# Patient Record
Sex: FEMALE | Race: WHITE | NOT HISPANIC OR LATINO | Employment: OTHER | URBAN - METROPOLITAN AREA
[De-identification: names, ages, dates, MRNs, and addresses within clinical notes are randomized per-mention and may not be internally consistent; named-entity substitution may affect disease eponyms.]

---

## 2022-03-18 ENCOUNTER — APPOINTMENT (OUTPATIENT)
Dept: RADIOLOGY | Facility: MEDICAL CENTER | Age: 77
End: 2022-03-18
Attending: INTERNAL MEDICINE
Payer: MEDICARE

## 2022-03-23 ENCOUNTER — APPOINTMENT (OUTPATIENT)
Dept: RADIOLOGY | Facility: MEDICAL CENTER | Age: 77
End: 2022-03-23
Attending: INTERNAL MEDICINE
Payer: MEDICARE

## 2022-03-24 ENCOUNTER — HOSPITAL ENCOUNTER (OUTPATIENT)
Dept: RADIOLOGY | Facility: MEDICAL CENTER | Age: 77
End: 2022-03-24
Attending: INTERNAL MEDICINE
Payer: MEDICARE

## 2022-03-24 DIAGNOSIS — R53.82 CHRONIC FATIGUE: ICD-10-CM

## 2022-03-24 DIAGNOSIS — R41.3 MEMORY LOSS: ICD-10-CM

## 2022-03-24 PROCEDURE — 70551 MRI BRAIN STEM W/O DYE: CPT | Mod: MG

## 2022-04-20 ENCOUNTER — TELEPHONE (OUTPATIENT)
Dept: NEUROLOGY | Facility: MEDICAL CENTER | Age: 77
End: 2022-04-20
Payer: MEDICARE

## 2022-04-21 ENCOUNTER — OFFICE VISIT (OUTPATIENT)
Dept: NEUROLOGY | Facility: MEDICAL CENTER | Age: 77
End: 2022-04-21
Attending: PSYCHIATRY & NEUROLOGY
Payer: MEDICARE

## 2022-04-21 VITALS
HEIGHT: 67 IN | WEIGHT: 167.99 LBS | DIASTOLIC BLOOD PRESSURE: 62 MMHG | TEMPERATURE: 97.6 F | SYSTOLIC BLOOD PRESSURE: 156 MMHG | BODY MASS INDEX: 26.37 KG/M2 | RESPIRATION RATE: 16 BRPM | OXYGEN SATURATION: 82 % | HEART RATE: 94 BPM

## 2022-04-21 DIAGNOSIS — Z78.9: Primary | ICD-10-CM

## 2022-04-21 PROCEDURE — 99205 OFFICE O/P NEW HI 60 MIN: CPT | Performed by: PSYCHIATRY & NEUROLOGY

## 2022-04-21 PROCEDURE — 99212 OFFICE O/P EST SF 10 MIN: CPT | Performed by: PSYCHIATRY & NEUROLOGY

## 2022-04-21 ASSESSMENT — ANXIETY QUESTIONNAIRES
4. TROUBLE RELAXING: SEVERAL DAYS
5. BEING SO RESTLESS THAT IT IS HARD TO SIT STILL: NOT AT ALL
3. WORRYING TOO MUCH ABOUT DIFFERENT THINGS: SEVERAL DAYS
2. NOT BEING ABLE TO STOP OR CONTROL WORRYING: NOT AT ALL
1. FEELING NERVOUS, ANXIOUS, OR ON EDGE: SEVERAL DAYS
IF YOU CHECKED OFF ANY PROBLEMS ON THIS QUESTIONNAIRE, HOW DIFFICULT HAVE THESE PROBLEMS MADE IT FOR YOU TO DO YOUR WORK, TAKE CARE OF THINGS AT HOME, OR GET ALONG WITH OTHER PEOPLE: NOT DIFFICULT AT ALL
6. BECOMING EASILY ANNOYED OR IRRITABLE: NOT AT ALL
GAD7 TOTAL SCORE: 3
7. FEELING AFRAID AS IF SOMETHING AWFUL MIGHT HAPPEN: NOT AT ALL

## 2022-04-21 ASSESSMENT — PATIENT HEALTH QUESTIONNAIRE - PHQ9: CLINICAL INTERPRETATION OF PHQ2 SCORE: 0

## 2022-04-21 NOTE — PROGRESS NOTES
"Reason for Neurology Consult:  ? Of Dementia or cognitive impairment    History of present illness:    LANG Harrell 76 y.o. right handed woman who was born in [place] and [place] and then [place] and moved in  2018 to [place].  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  She retired formally in 2009. She lives with her  a 2 year old dog.    She describes nor endorses any  specific  issues with cognition and memory and we discussed specific issues about ADL's and she has not had any issues with her abilities. She denies being forgetful, misplacing personal items, needing to rely on a calendar or notes nor losing track of time/dates nor having any issues driving at all.    She continues to exercise  (2.4 miles- 3 times a week) and she will get a vascular performance machine 2 days per week.    She rarely has a headache but infrequently may have a \"band like sensation or sponge going in and out over the bilateral head region\" which started to occur over the last 2-3 years and usually  over her forehead and that can last for 2-3 minutes (not hours to days) and without any associated confusion,disorientation,speech disturbance(s), focal motor-sensory features,visual changes or sensitivity to light or sound  and she will take deep breath and activate a conversation. This tends to occur 4 times per nona without any discrete triggers she is aware of.    She has not had any orthostatic dizziness/lightheadedness or near syncope in the last 6-12 months.    She has not had any TIA/Stroke(s) known or documented in the EMR.    She has noticed since 1790-0890 to have a low level aching of the anterior legs and rarely the feet (but today her feet feel completely normal) . She has a deep seated ache in her lower legs (not superficially located) and not discretely associated with numbness or tingling of the feet,toes or hands. She had been on " "Gabapentin since 2019 and even in the 7 years previous to starting the Gabapentin (BID dosing) she had not noticed any symptoms evolving of her legs or changing in it's characteristics. She has not had any muscle cramps,muscle spasms or evolving stiffness or tightness feelings of the lower legs.    She denies any discrete,daily or ongoing numbness,tingling,burning,aching of the toes,pads of feet or dorsal feet in the last 6 months.    She has not had any seizure type events when discussing any paroxysmal events she may have had  and she denies having any paroxysmal events (other when \"purposefully \"fainting\"  when in [place] [employer] when age 13-16).    She has no evidence for involuntary movements of the limbs or body in the last 6-12 months.    She denies any lower back pains,aches or radicular type complaints going down either legs or into discrete areas of her leg(s) in the last 6-12 months.    She has not had any problems with increasing frequency,urgency or zac incontinence in the last 6 months.    She denies any problems or difficulty with getting sleep and infrequently has vivid dreams but nothing to me that sounds like REM Sleep Behavioral symptoms. Averages 6 hours of sleep for many years and generally feels rested.    She has not noticed any evolving gait-balance decline in the last 3-6 months.        There are no problems to display for this patient.      Past medical history:   No past medical history on file.    Past surgical history:   No past surgical history on file.      Social history:   Social History     Socioeconomic History   • Marital status:      Spouse name: Not on file   • Number of children: Not on file   • Years of education: Not on file   • Highest education level: Not on file   Occupational History   • Not on file   Tobacco Use   • Smoking status: Former Smoker     Types: Cigarettes     Quit date: 2000     Years since quittin.3   • Smokeless tobacco: Never Used   Vaping " "Use   • Vaping Use: Never used   Substance and Sexual Activity   • Alcohol use: Not on file   • Drug use: Not on file   • Sexual activity: Not on file   Other Topics Concern   • Not on file   Social History Narrative   • Not on file     Social Determinants of Health     Financial Resource Strain: Not on file   Food Insecurity: Not on file   Transportation Needs: Not on file   Physical Activity: Not on file   Stress: Not on file   Social Connections: Not on file   Intimate Partner Violence: Not on file   Housing Stability: Not on file       Family history:   No family history on file.      Current medications:   Current Outpatient Medications   Medication   • ofloxacin (OCUFLOX) 0.3 % Solution   • gabapentin (NEURONTIN) 300 MG Cap   • Ascorbic Acid (VITAMIN C) 1000 MG Tab   • vitamin D3 (CHOLECALCIFEROL) 1000 Unit (25 mcg) Tab   • Black Pepper-Turmeric (TURMERIC COMPLEX/BLACK PEPPER PO)   • Multiple Vitamins-Minerals (PRESERVISION AREDS) Cap   • Garlic 1000 MG Cap   • Calcium-Magnesium-Zinc 333-133-5 MG Tab   • prednisoLONE acetate (PRED FORTE) 1 % Suspension     No current facility-administered medications for this visit.       Medication Allergy:  No Known Allergies        Physical examination:   Vitals:    04/21/22 1601   BP: 156/62   BP Location: Right arm   Patient Position: Sitting   BP Cuff Size: Adult   Pulse: 94   Resp: 16   Temp: 36.4 °C (97.6 °F)   TempSrc: Temporal   SpO2: (!) 82%   Weight: 76.2 kg (167 lb 15.9 oz)   Height: 1.702 m (5' 7\")       Normal cephalic atraumatic.  There is full range of movement around the neck in all directions without restrictions or discrete pain evoked triggers.  No lower extremity edema.      Neurological  Exam:      Chuck Cognitive Assessment (MOCA) Version 7.1    Years of Education:  education    TOTAL SCORE: 30/30  (to be scanned into the MEDIA section in the E.M.R.)          Mental status: Awake, alert and fully oriented to person, place, time and situation. " Normal attention, concentration and fund of knowledge for education level.  Did not appear/act combative,irritable,anxious,paranoid/delusional or aggressive to or with me.    Speech and language: Speech is fluent without errors, clear, intact to repetition and intact to naming.     Follows 3 step motor commands in sequence without significant delay and correctly.    Cranial nerve exam:  II: Pupils are equally round and reactive to light. Visual fields are intact by confrontation.  III, IV, VI: EOMI, no diplopia, no ptosis.  Snellen Card- 20/20 bilaterally.    V: Sensation to light touch is normal over V1-3 distributions bilaterally.  .  VII: Facial movements are symmetrical. There is no facial droop. .  VIII: Hearing intact to soft speech and finger rub bilaterally  IX: Palate elevates symmetrically, uvula is midline. Dysarthria is not present.  XI: Shoulder shrug are symmetrical and strong.   XII: Tongue protrudes midline. and No tongue fasciculations.      Motor exam:  Muscle tone is normal in all 4 limbs. and No abnormal movements appreciated.    Muscle strength:    There is no spasticity of the arms or legs.    There is no cogwheeling of the arms.    Normal repetitive finger tapping and opening and closing of the hands and heel to chin testing as well as repetitive foot tapping.    Neck Flexors/Extensors: 5/5       Right  Left  Deltoid   5/5  5/5      Biceps   5/5  5/5  Triceps  5/5  5/5   Wrist extensors 5/5  5/5  Wrist flexors  5/5  5/5     5/5  5/5  Interossei  5/5  5/5  Thenar (APB)  5/5  5/5   Hip flexors  5/5  5/5  Quadriceps  5/5  5/5    Hamstrings  5/5  5/5  Dorsiflexors  5/5  5/5  Plantarflexors  5/5  5/5  Toe extension  5/5  5/5      Sensory exam:    Vibratory: 12-14 seconds at the great toes, 12-14 seconds at the ankles, 14 seconds at the knees, 20-22 seconds       Proprioception: normal at the great toes.    Pin prick normal at great toes compared to dorsal feet,lower legs, distal anterior  thighs. No stocking distribution reduction to pinprick.    Reflexes:       Right  Left  Biceps   2/4  2/4  Triceps  2/4  2/4  Brachioradialis 2/4  2/4  Knee jerk  2/4  2/4  Ankle jerk  1/4  1/4     Frontal release signs are absent.    bilaterally toes are downgoing to plantar stimulation..    Coordination (finger-to-nose, heel/knee/shin, rapid alternating movements) was normal.     There was no ataxia, no tremors, and no dysmetria.     Station and gait were normal.     Easily stands up from exam chair without retropulsion,veering,leaning,swaying (to either side).     No apraxia,shuffling,freezing of gait or when walking down the aevry      Labs and Tests:    Blood Work reviewed: immune testing > DNA DS (very mildly elevated at 13)  CBC,CRP,ESR: wnl  B12: over 500  Folate: wnl  TSH: wnl       NEUROIMAGING:     Brain MRI reviewed today with S.M.      Impression/Plans/Recommendations:    1.  There is no evidence for an acute,subacute or chronic encephalopathy. There are features of significant cognitive impairment for her age.    MoCA score of 30/30 today which is encouraging.    2. There are no clinical features of a polyneuropathy or myelopathy at this time.    3. There are no clinical features of Restless Leg Syndrome.    At this time I do not feel that an EEG,CSF study are  required.    At this time I do not feel that a spinal cord MRI or electrodiagnostic testing of the lower legs/feet is necessary.    We had a 15  minute discussion about her concerns today.        I have performed  a history and physical exam and a directed /focused  ROS today.    Total time spent today or this patient's care was 65   minutes  and included reviewing  the diagnostic workup to date (such as labs and imaging as well as interpreting such tests relevant to this patient's neurological condition) and counseling and educating S.M.  on issues related to cognition/memory and ways to stay cognitively healthy   and documenting  the clinical  information in the EMR.    Follow up at this point PRN.        Enrique Cortes MD  [facility].   [facility]

## 2022-10-06 ENCOUNTER — OFFICE VISIT (OUTPATIENT)
Dept: ENDOCRINOLOGY | Facility: MEDICAL CENTER | Age: 77
End: 2022-10-06
Attending: INTERNAL MEDICINE
Payer: MEDICARE

## 2022-10-06 VITALS
WEIGHT: 164 LBS | HEART RATE: 70 BPM | BODY MASS INDEX: 25.74 KG/M2 | SYSTOLIC BLOOD PRESSURE: 120 MMHG | OXYGEN SATURATION: 95 % | HEIGHT: 67 IN | DIASTOLIC BLOOD PRESSURE: 86 MMHG

## 2022-10-06 DIAGNOSIS — E23.6 RATHKE'S CLEFT CYST (HCC): ICD-10-CM

## 2022-10-06 DIAGNOSIS — R79.89 ABNORMAL THYROID BLOOD TEST: ICD-10-CM

## 2022-10-06 DIAGNOSIS — E03.8 SECONDARY HYPOTHYROIDISM: ICD-10-CM

## 2022-10-06 PROCEDURE — 99211 OFF/OP EST MAY X REQ PHY/QHP: CPT | Performed by: INTERNAL MEDICINE

## 2022-10-06 PROCEDURE — 99205 OFFICE O/P NEW HI 60 MIN: CPT | Performed by: INTERNAL MEDICINE

## 2022-10-06 RX ORDER — LOSARTAN POTASSIUM 25 MG/1
TABLET ORAL
COMMUNITY
Start: 2022-09-15 | End: 2023-06-13

## 2022-10-06 ASSESSMENT — FIBROSIS 4 INDEX: FIB4 SCORE: 0.91

## 2022-10-06 NOTE — PROGRESS NOTES
Chief Complaint: Consult requested by Scarlet Kirby D.O. for evaluation of Rathke cleft cyst    HPI:     LANG Harrell is a 76 y.o. female with the above medical issue  She reports that she has been experiencing chronic fatigue for the past 5 to 7 years.  She reports lack of vitality despite sleeping well.  She reports numbness and tingling in her arms and legs after sitting for a long while.  And her symptoms are not resolved by gabapentin.  She is taking gabapentin after she experienced pain in her legs after statin therapy.  She denies weight gain, cold intolerance, constipation, blurring of vision, headaches.  She underwent menopause at the age of 57.  She reports occasional dizziness but denies orthostatic symptoms, hypertension and salt craving.      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         Incidentally I noted that on January 25, 2022 she had labs showing a vitamin D of 50, folate of 17, B12 531 normal TSH 1.70 with a low free T4 of 0.61.  Her TPO antibodies were negative.      Today we discussed that her labs on January 25, 2022 for her thyroid are consistent with secondary hypothyroidism          Patient's medications, allergies, and social histories were reviewed and updated as appropriate.      ROS:     CONS:     No fever, no chills, no weight loss, reports fatigue   EYES:      No diplopia, no blurry vision, no redness of eyes, no swelling of eyelids   ENT:    No hearing loss, No ear pain, No sore throat, no dysphagia, no neck swelling   CV:     No chest pain, no palpitations, no claudication, no orthopnea, no PND   PULM:    No SOB, no cough, no hemoptysis, no wheezing    GI:   No nausea, no vomiting, no diarrhea, no constipation, no bloody stools   :  Passing urine well, no dysuria, no hematuria   ENDO:   No polyuria, no polydipsia, no heat  intolerance, no cold intolerance   NEURO: No headaches, no dizziness, no convulsions, no tremors, reports numbness in the arms, hands and legs after prolonged sitting   MUSC:  No joint swellings, no arthralgias, no myalgias, no weakness   SKIN:   No rash, no ulcers, no dry skin   PSYCH:   No depression, no anxiety, no difficulty sleeping       Past Medical History:  Patient Active Problem List    Diagnosis Date Noted    Rathke's cleft cyst (HCC) 10/06/2022    History of colon polyps 07/31/2015    Osteopenia 07/31/2015    Hyperlipidemia 07/16/2015    Spinal stenosis of lumbar region 02/13/2012    Constipation 08/01/2008    Hemorrhoids 08/01/2008       Past Surgical History:  History reviewed. No pertinent surgical history.     Allergies:  Patient has no known allergies.     Current Medications:    Current Outpatient Medications:     hydroxychloroquine (PLAQUENIL) 200 MG Tab, 400mg (2 tabs)  on Monday, wed, and Friday and 200mg (1 tab) every other day of the week. (Patient taking differently: 400mg (2 tabs)  on Monday, wed, and Friday and 200mg (1 tab) every other day of the week.), Disp: 135 Tablet, Rfl: 1    ofloxacin (OCUFLOX) 0.3 % Solution, ofloxacin 0.3 % eye drops  INT 1 GTT IN OS QID, Disp: , Rfl:     gabapentin (NEURONTIN) 300 MG Cap, Take 300 mg by mouth 2 times a day., Disp: , Rfl:     Ascorbic Acid (VITAMIN C) 1000 MG Tab, Take  by mouth. OTC, Disp: , Rfl:     vitamin D3 (CHOLECALCIFEROL) 1000 Unit (25 mcg) Tab, Take 1,000 Units by mouth every day. OTC, Disp: , Rfl:     Black Pepper-Turmeric (TURMERIC COMPLEX/BLACK PEPPER PO), Take 1,000 mg by mouth. OTC, Disp: , Rfl:     Multiple Vitamins-Minerals (PRESERVISION AREDS) Cap, Take  by mouth. OTC, Disp: , Rfl:     Garlic 1000 MG Cap, Take  by mouth. OTC, Disp: , Rfl:     Calcium-Magnesium-Zinc 333-133-5 MG Tab, Take  by mouth. OTC, Disp: , Rfl:     losartan (COZAAR) 25 MG Tab, TAKE 1 TABLET BY MOUTH DAILY AS NEEDED FOR HIGH BLOOD PRESSURE (Patient not taking:  "Reported on 10/6/2022), Disp: , Rfl:     Social History:  Social History     Socioeconomic History    Marital status:      Spouse name: Not on file    Number of children: Not on file    Years of education: Not on file    Highest education level: Not on file   Occupational History    Not on file   Tobacco Use    Smoking status: Former     Types: Cigarettes     Quit date: 2000     Years since quittin.7    Smokeless tobacco: Never   Vaping Use    Vaping Use: Never used   Substance and Sexual Activity    Alcohol use: Yes     Comment: Daily    Drug use: Not Currently    Sexual activity: Not on file   Other Topics Concern    Not on file   Social History Narrative    Not on file     Social Determinants of Health     Financial Resource Strain: Not on file   Food Insecurity: Not on file   Transportation Needs: Not on file   Physical Activity: Not on file   Stress: Not on file   Social Connections: Not on file   Intimate Partner Violence: Not on file   Housing Stability: Not on file        Family History:   History reviewed. No pertinent family history.      PHYSICAL EXAM:   Vital signs: /86 (BP Location: Left arm, Patient Position: Sitting, BP Cuff Size: Adult)   Pulse 70   Ht 1.702 m (5' 7\")   Wt 74.4 kg (164 lb)   SpO2 95%   BMI 25.69 kg/m²   GENERAL: Well-developed, well-nourished  in no apparent distress.   EYE: No ocular and eyelid asymmetry, Anicteric sclerae,  PERRL  HENT: Hearing grossly intact, Normocephalic, atraumatic. Pink, moist mucous membranes, No exudate  NECK: Supple. Trachea midline. thyroid is normal in size without nodules or tenderness  CARDIOVASCULAR: Regular rate and rhythm. No murmurs, rubs, or gallops.   LUNGS: Clear to auscultation bilaterally   ABDOMEN: Soft, nontender with positive bowel sounds.   EXTREMITIES: No clubbing, cyanosis, or edema.   NEUROLOGICAL: Cranial nerves II-XII are grossly intact   Symmetric reflexes at the patella no proximal muscle weakness  LYMPH: " No cervical, supraclavicular,  adenopathy palpated.   SKIN: No rashes, lesions. Turgor is normal.    Labs:  Lab Results   Component Value Date/Time    WBC 7.6 08/11/2022 11:13 AM    RBC 4.08 (L) 08/11/2022 11:13 AM    HEMOGLOBIN 12.7 (L) 08/11/2022 11:13 AM    MCV 94.6 08/11/2022 11:13 AM    MCH 31.1 (H) 08/11/2022 11:13 AM    MCHC 32.9 (L) 08/11/2022 11:13 AM    RDW 14.4 08/11/2022 11:13 AM    MPV 9.8 08/11/2022 11:13 AM       Lab Results   Component Value Date/Time    SODIUM 140 08/11/2022 11:13 AM    POTASSIUM 4.2 08/11/2022 11:13 AM    CHLORIDE 102 08/11/2022 11:13 AM    CO2 28 08/11/2022 11:13 AM    ANION 14 08/11/2022 11:13 AM    GLUCOSE 77 08/11/2022 11:13 AM    BUN 13 08/11/2022 11:13 AM    CREATININE 0.9 08/11/2022 11:13 AM    CALCIUM 9.3 08/11/2022 11:13 AM    ASTSGOT 26 08/11/2022 11:13 AM    ALTSGPT 49 08/11/2022 11:13 AM    TBILIRUBIN 0.4 08/11/2022 11:13 AM    ALBUMIN 3.7 08/11/2022 11:13 AM    ALBUMIN 3.9 08/11/2022 11:13 AM    TOTPROTEIN 7.0 08/11/2022 11:13 AM    AGRATIO 1.1 08/11/2022 11:13 AM       No results found for: CHOLSTRLTOT, TRIGLYCERIDE, HDL, LDL, CHOLHDLRAT, NONHDL    Lab Results   Component Value Date/Time    TSHULTRASEN 1.78 01/25/2022 1148     Lab Results   Component Value Date/Time    FREET4 0.61 (L) 01/25/2022 1148     No results found for: FREET3  No results found for: THYSTIMIG    No results found for: MICROSOMALA      Imaging:     3/24/2022 6:35 PM     HISTORY/REASON FOR EXAM:  Memory Loss; Dizziness, non-specific.        TECHNIQUE/EXAM DESCRIPTION:  MRI of the brain without contrast.     T1 sagittal, T2 fast spin-echo axial, T1 coronal, FLAIR coronal, diffusion-weighted and apparent diffusion coefficient (ADC map) axial images were obtained of the whole brain.     The study was performed on a Jes 3.0 Mariana MRI scanner.     COMPARISON:  None.     FINDINGS: There is no acute infarct. There is no acute or chronic parenchymal hemorrhage. A few nonspecific T2 hyperintensities are  noted in the subcortical and periventricular white matter. Mild cerebral volume loss is seen. There is no intra-axial   space-occupying lesion. There is an approximately 7 to 8 mm sized T2 hyperintense lesion in the posterior portion of the pituitary gland. The hypothalamic and the pineal regions are unremarkable. There is no intra-axial space-occupying lesion. There is   no hippocampal volume loss or signal change.     The visualized flow voids of the cerebral vasculature are unremarkable.  There is no large lesion identified in the expected course of the intracranial portions of the cranial nerves.There is no extra-axial fluid collection, hemorrhage or mass.     The skull bones are unremarkable. The paranasal sinuses are clear. The visualized visualized mucosal surfaces are unremarkable.     The extracranial soft tissue including orbits appear grossly normal.     IMPRESSION:     1.  No acute abnormality.  2.  Mild cerebral volume loss.  3.  Mild chronic microvascular ischemic disease.  4.  There is an approximately 7 to 8 mm sized T2 hyperintense lesion in the posterior portion of the pituitary gland likely representing a Rathke's cleft cyst with proteinaceous secretions.    ASSESSMENT/PLAN:     1. Rathke's cleft cyst (HCC)  Stable  Reviewed imaging results with patient  She is asymptomatic however I discussed that we should check her pituitary hormones especially because of her previous abnormal labs showing a normal TSH with low free T4    I want her to get updated labs to check for pituitary insufficiency  Today we will check a morning ACTH, cortisol, TSH, free T4, prolactin, IGF-I, LH, FSH and estradiol level and I will update her    Her pituitary MRI should be repeated again in 12 months    2. Abnormal thyroid blood test  Unstable  In January 2022 she had abnormal TSH test with a very low free T4 compatible with secondary hypothyroidism  She has a Rathke cleft cyst measuring 8 mm which may be potentially  compressing her anterior pituitary gland    We are reevaluating her thyroid function and getting complete labs and I will update her and recommendations if medically necessary    3. Secondary hypothyroidism  See discussion above      Return in about 6 months (around 4/6/2023).       Thank you kindly for allowing me to participate in the thyroid care plan for this patient.    Total time spent on date of service was over 60 minutes which included taking a detailed history and physical exam, ordering labs, cording care and scheduling future follow-up    Nba Jenkins MD, [facility], [facility]  10/06/22    CC:   Scarlet Kirby D.O.

## 2022-10-07 ENCOUNTER — HOSPITAL ENCOUNTER (OUTPATIENT)
Dept: LAB | Facility: MEDICAL CENTER | Age: 77
End: 2022-10-07
Attending: INTERNAL MEDICINE
Payer: MEDICARE

## 2022-10-07 DIAGNOSIS — R79.89 ABNORMAL THYROID BLOOD TEST: ICD-10-CM

## 2022-10-07 DIAGNOSIS — E23.6 RATHKE'S CLEFT CYST (HCC): ICD-10-CM

## 2022-10-07 DIAGNOSIS — E03.8 SECONDARY HYPOTHYROIDISM: ICD-10-CM

## 2022-10-07 LAB
CORTIS SERPL-MCNC: 10.7 UG/DL (ref 0–23)
ESTRADIOL SERPL-MCNC: <5 PG/ML
FSH SERPL-ACNC: 42.5 MIU/ML
LH SERPL-ACNC: 20.9 IU/L
PROLACTIN SERPL-MCNC: 6.91 NG/ML (ref 2.8–26)
T4 FREE SERPL-MCNC: 1.02 NG/DL (ref 0.93–1.7)
TSH SERPL DL<=0.005 MIU/L-ACNC: 3.38 UIU/ML (ref 0.38–5.33)

## 2022-10-07 PROCEDURE — 36415 COLL VENOUS BLD VENIPUNCTURE: CPT

## 2022-10-07 PROCEDURE — 84305 ASSAY OF SOMATOMEDIN: CPT

## 2022-10-07 PROCEDURE — 83002 ASSAY OF GONADOTROPIN (LH): CPT

## 2022-10-07 PROCEDURE — 84439 ASSAY OF FREE THYROXINE: CPT

## 2022-10-07 PROCEDURE — 82670 ASSAY OF TOTAL ESTRADIOL: CPT

## 2022-10-07 PROCEDURE — 84146 ASSAY OF PROLACTIN: CPT

## 2022-10-07 PROCEDURE — 82533 TOTAL CORTISOL: CPT

## 2022-10-07 PROCEDURE — 84443 ASSAY THYROID STIM HORMONE: CPT

## 2022-10-07 PROCEDURE — 82024 ASSAY OF ACTH: CPT

## 2022-10-07 PROCEDURE — 83520 IMMUNOASSAY QUANT NOS NONAB: CPT

## 2022-10-07 PROCEDURE — 83001 ASSAY OF GONADOTROPIN (FSH): CPT

## 2022-10-09 LAB — ACTH PLAS-MCNC: 18.4 PG/ML (ref 7.2–63.3)

## 2022-10-10 LAB
IGF-I SERPL-MCNC: 122 NG/ML (ref 22–220)
IGF-I Z-SCORE SERPL: 0.6

## 2022-10-19 LAB — MISCELLANEOUS LAB RESULT MISCLAB: NORMAL

## 2022-12-05 ENCOUNTER — TELEPHONE (OUTPATIENT)
Dept: ENDOCRINOLOGY | Facility: MEDICAL CENTER | Age: 77
End: 2022-12-05
Payer: MEDICARE

## 2022-12-05 NOTE — TELEPHONE ENCOUNTER
VOICEMAIL  1. Caller Name: LANG Harrell                        Call Back Number: [phone] (home)      2. Message: Patient called and left message stating she needed to schedule an appointment. I have called her back and left her a message to call us back.     3. Patient approves office to leave a detailed voicemail/MyChart message: N\A

## 2023-01-04 ENCOUNTER — APPOINTMENT (OUTPATIENT)
Dept: ENDOCRINOLOGY | Facility: MEDICAL CENTER | Age: 78
End: 2023-01-04
Attending: INTERNAL MEDICINE
Payer: MEDICARE

## 2023-01-04 DIAGNOSIS — E03.8 SECONDARY HYPOTHYROIDISM: ICD-10-CM

## 2023-01-04 DIAGNOSIS — R79.89 ABNORMAL THYROID BLOOD TEST: ICD-10-CM

## 2023-01-04 DIAGNOSIS — E23.6 RATHKE'S CLEFT CYST (HCC): ICD-10-CM

## 2023-01-04 PROCEDURE — 99214 OFFICE O/P EST MOD 30 MIN: CPT | Mod: 95 | Performed by: INTERNAL MEDICINE

## 2023-01-04 NOTE — PROGRESS NOTES
Chief Complaint: Follow up for Rathke cleft cyst and history of abnormal thyroid function test now resolved  Patient was presented for a telehealth consultation via secure and encrypted videoconferencing technology. This encounter was conducted via Zoom . Verbal consent was obtained. Patient's identity was verified.      HPI:     LANG Harrell is a 77 y.o. female here for follow up of   The above medical issue    In summary I saw her initially as a consult back in October 6, 2022 for chronic fatigue for the past 7 years.  She also reported numbness and tingling in her arms and legs not controlled by gabapentin.  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 her TPO antibodies were negative     When I saw her initially I recommended repeat labs including assessment of her anterior pituitary function    Fortunately this showed that her TSH was normal at 3.3 and her free T4 was normal at 1.02 ruling out underlying endogenous secondary hypothyroidism    Her cortisol was normal at 10.7, ACTH was normal 18 October 7, 2022    Her IGF-I was normal 122    Her prolactin was normal at 6.91    Her LH was elevated at 20 and FSH was elevated at 40 and estradiol low at less than 5 compatible with her postmenopausal status    Alpha subunit was normal at 0.61           Patient's medications, allergies, and social histories were reviewed and updated as appropriate.      ROS:     CONS:     No fever, no chills   EYES:     No diplopia, no blurry vision   CV:           No chest pain, no palpitations   PULM:     No SOB, no cough, no hemoptysis.   GI:            No nausea, no vomiting, no diarrhea, no constipation   ENDO:     No  polyuria, no polydipsia, no heat intolerance, no cold intolerance       Past Medical History:  Problem List:  2022-10: Rathke's cleft cyst (HCC)  2015: History of colon polyps  2015: Osteopenia  2015: Hyperlipidemia  2012: Spinal stenosis of lumbar region  2008: Constipation  2008: Hemorrhoids      Past Surgical History:  No past surgical history on file.     Allergies:  Patient has no known allergies.     Social History:  Social History     Tobacco Use    Smoking status: Former     Types: Cigarettes     Quit date: 2000     Years since quittin.0    Smokeless tobacco: Never   Vaping Use    Vaping Use: Never used   Substance Use Topics    Alcohol use: Yes     Comment: Daily    Drug use: Not Currently        Family History:   family history is not on file.      PHYSICAL EXAM:   Vital signs: There were no vitals taken for this visit.  GENERAL: Well-developed, well-nourished in no apparent distress.   EYE:  No ocular asymmetry, PERRLA  HENT: Pink, moist mucous membranes.    NECK: No thyromegaly.   CARDIOVASCULAR:  No murmurs  LUNGS: Clear breath sounds  ABDOMEN: Soft, nontender   EXTREMITIES: No clubbing, cyanosis, or edema.   NEUROLOGICAL: No gross focal motor abnormalities   LYMPH: No cervical adenopathy palpated.   SKIN: No rashes, lesions.       Labs:  Lab Results   Component Value Date/Time    SODIUM 140 2022 11:13 AM    POTASSIUM 4.2 2022 11:13 AM    CHLORIDE 102 2022 11:13 AM    CO2 28 2022 11:13 AM    ANION 14 2022 11:13 AM    GLUCOSE 77 2022 11:13 AM    BUN 13 2022 11:13 AM    CREATININE 0.9 2022 11:13 AM    CALCIUM 9.3 2022 11:13 AM    ASTSGOT 26 2022 11:13 AM    ALTSGPT 49 2022 11:13 AM    TBILIRUBIN 0.4 2022 11:13 AM    ALBUMIN 3.7 2022 11:13 AM    ALBUMIN 3.9 2022 11:13 AM    TOTPROTEIN 7.0 2022 11:13 AM    AGRATIO 1.1 2022 11:13 AM       Lab Results   Component Value Date/Time    SODIUM  140 08/11/2022 1113    POTASSIUM 4.2 08/11/2022 1113    CHLORIDE 102 08/11/2022 1113    CO2 28 08/11/2022 1113    GLUCOSE 77 08/11/2022 1113    BUN 13 08/11/2022 1113    CREATININE 0.9 08/11/2022 1113    CALCIUM 9.3 08/11/2022 1113    ANION 14 08/11/2022 1113       No results found for: CHOLSTRLTOT, TRIGLYCERIDE, HDL, LDL, CHOLHDLRAT, NONHDL    Lab Results   Component Value Date/Time    TSHULTRASEN 3.380 10/07/2022 0745     Lab Results   Component Value Date/Time    FREET4 1.02 10/07/2022 0745     No results found for: FREET3  No results found for: THYSTIMIG    No results found for: MICROSOMALA      Imaging:     3/24/2022 6:35 PM     HISTORY/REASON FOR EXAM:  Memory Loss; Dizziness, non-specific.        TECHNIQUE/EXAM DESCRIPTION:  MRI of the brain without contrast.     T1 sagittal, T2 fast spin-echo axial, T1 coronal, FLAIR coronal, diffusion-weighted and apparent diffusion coefficient (ADC map) axial images were obtained of the whole brain.     The study was performed on a Jes 3.0 Mariana MRI scanner.     COMPARISON:  None.     FINDINGS: There is no acute infarct. There is no acute or chronic parenchymal hemorrhage. A few nonspecific T2 hyperintensities are noted in the subcortical and periventricular white matter. Mild cerebral volume loss is seen. There is no intra-axial   space-occupying lesion. There is an approximately 7 to 8 mm sized T2 hyperintense lesion in the posterior portion of the pituitary gland. The hypothalamic and the pineal regions are unremarkable. There is no intra-axial space-occupying lesion. There is   no hippocampal volume loss or signal change.     The visualized flow voids of the cerebral vasculature are unremarkable.  There is no large lesion identified in the expected course of the intracranial portions of the cranial nerves.There is no extra-axial fluid collection, hemorrhage or mass.     The skull bones are unremarkable. The paranasal sinuses are clear. The visualized visualized  mucosal surfaces are unremarkable.     The extracranial soft tissue including orbits appear grossly normal.     IMPRESSION:     1.  No acute abnormality.  2.  Mild cerebral volume loss.  3.  Mild chronic microvascular ischemic disease.  4.  There is an approximately 7 to 8 mm sized T2 hyperintense lesion in the posterior portion of the pituitary gland likely representing a Rathke's cleft cyst with proteinaceous secretions.    ASSESSMENT/PLAN:     1. Rathke's cleft cyst (HCC)  Stable there is no evidence of anterior pituitary hormone abnormality  Recommend observation do not recommend surgery  Recommend repeat pituitary MRI next year    2. Abnormal thyroid blood test  Resolved  There is no evidence of endogenous secondary hypothyroidism or thyroid dysfunction  TSH and free T4 levels are back to normal  Recommend observation and repeat labs next year    3. Secondary hypothyroidism  See discussion above      Return in about 1 year (around 1/4/2024).      Thank you kindly for allowing me to participate in the thyroid care plan for this patient.    Nba Jenkins MD, FACE, Replaced by [facility]  01/04/23    CC:   Scarlet Kirby D.O.

## 2023-05-09 ENCOUNTER — HOSPITAL ENCOUNTER (OUTPATIENT)
Dept: LAB | Facility: MEDICAL CENTER | Age: 78
End: 2023-05-09
Attending: STUDENT IN AN ORGANIZED HEALTH CARE EDUCATION/TRAINING PROGRAM
Payer: MEDICARE

## 2023-05-09 LAB
ALBUMIN SERPL BCP-MCNC: 4.2 G/DL (ref 3.2–4.9)
ALBUMIN/GLOB SERPL: 1.4 G/DL
ALP SERPL-CCNC: 96 U/L (ref 30–99)
ALT SERPL-CCNC: 31 U/L (ref 2–50)
ANION GAP SERPL CALC-SCNC: 13 MMOL/L (ref 7–16)
AST SERPL-CCNC: 22 U/L (ref 12–45)
BILIRUB SERPL-MCNC: 0.8 MG/DL (ref 0.1–1.5)
BUN SERPL-MCNC: 10 MG/DL (ref 8–22)
CALCIUM ALBUM COR SERPL-MCNC: 9.4 MG/DL (ref 8.5–10.5)
CALCIUM SERPL-MCNC: 9.6 MG/DL (ref 8.5–10.5)
CHLORIDE SERPL-SCNC: 102 MMOL/L (ref 96–112)
CHOLEST SERPL-MCNC: 269 MG/DL (ref 100–199)
CO2 SERPL-SCNC: 26 MMOL/L (ref 20–33)
CREAT SERPL-MCNC: 0.76 MG/DL (ref 0.5–1.4)
FASTING STATUS PATIENT QL REPORTED: NORMAL
GFR SERPLBLD CREATININE-BSD FMLA CKD-EPI: 81 ML/MIN/1.73 M 2
GLOBULIN SER CALC-MCNC: 3 G/DL (ref 1.9–3.5)
GLUCOSE SERPL-MCNC: 91 MG/DL (ref 65–99)
HDLC SERPL-MCNC: 64 MG/DL
LDLC SERPL CALC-MCNC: 164 MG/DL
POTASSIUM SERPL-SCNC: 4.2 MMOL/L (ref 3.6–5.5)
PROT SERPL-MCNC: 7.2 G/DL (ref 6–8.2)
SODIUM SERPL-SCNC: 141 MMOL/L (ref 135–145)
TRIGL SERPL-MCNC: 207 MG/DL (ref 0–149)

## 2023-05-09 PROCEDURE — 80061 LIPID PANEL: CPT

## 2023-05-09 PROCEDURE — 80053 COMPREHEN METABOLIC PANEL: CPT

## 2023-05-09 PROCEDURE — 36415 COLL VENOUS BLD VENIPUNCTURE: CPT

## 2023-05-30 ENCOUNTER — HOSPITAL ENCOUNTER (OUTPATIENT)
Dept: LAB | Facility: MEDICAL CENTER | Age: 78
End: 2023-05-30
Attending: STUDENT IN AN ORGANIZED HEALTH CARE EDUCATION/TRAINING PROGRAM
Payer: MEDICARE

## 2023-05-30 LAB
BASOPHILS # BLD AUTO: 1.6 % (ref 0–1.8)
BASOPHILS # BLD: 0.1 K/UL (ref 0–0.12)
CHOLEST SERPL-MCNC: 186 MG/DL (ref 100–199)
EOSINOPHIL # BLD AUTO: 0.28 K/UL (ref 0–0.51)
EOSINOPHIL NFR BLD: 4.6 % (ref 0–6.9)
ERYTHROCYTE [DISTWIDTH] IN BLOOD BY AUTOMATED COUNT: 43.1 FL (ref 35.9–50)
FASTING STATUS PATIENT QL REPORTED: NORMAL
HCT VFR BLD AUTO: 41 % (ref 37–47)
HDLC SERPL-MCNC: 49 MG/DL
HGB BLD-MCNC: 13.8 G/DL (ref 12–16)
IMM GRANULOCYTES # BLD AUTO: 0.02 K/UL (ref 0–0.11)
IMM GRANULOCYTES NFR BLD AUTO: 0.3 % (ref 0–0.9)
LDLC SERPL CALC-MCNC: 106 MG/DL
LYMPHOCYTES # BLD AUTO: 2.51 K/UL (ref 1–4.8)
LYMPHOCYTES NFR BLD: 41.1 % (ref 22–41)
MCH RBC QN AUTO: 31.3 PG (ref 27–33)
MCHC RBC AUTO-ENTMCNC: 33.7 G/DL (ref 32.2–35.5)
MCV RBC AUTO: 93 FL (ref 81.4–97.8)
MONOCYTES # BLD AUTO: 0.59 K/UL (ref 0–0.85)
MONOCYTES NFR BLD AUTO: 9.7 % (ref 0–13.4)
NEUTROPHILS # BLD AUTO: 2.6 K/UL (ref 1.82–7.42)
NEUTROPHILS NFR BLD: 42.7 % (ref 44–72)
NRBC # BLD AUTO: 0 K/UL
NRBC BLD-RTO: 0 /100 WBC (ref 0–0.2)
PLATELET # BLD AUTO: 285 K/UL (ref 164–446)
PMV BLD AUTO: 9.7 FL (ref 9–12.9)
RBC # BLD AUTO: 4.41 M/UL (ref 4.2–5.4)
T4 FREE SERPL-MCNC: 1.11 NG/DL (ref 0.93–1.7)
TRIGL SERPL-MCNC: 156 MG/DL (ref 0–149)
TSH SERPL DL<=0.005 MIU/L-ACNC: 3.37 UIU/ML (ref 0.38–5.33)
WBC # BLD AUTO: 6.1 K/UL (ref 4.8–10.8)

## 2023-05-30 PROCEDURE — 84439 ASSAY OF FREE THYROXINE: CPT

## 2023-05-30 PROCEDURE — 84443 ASSAY THYROID STIM HORMONE: CPT

## 2023-05-30 PROCEDURE — 85025 COMPLETE CBC W/AUTO DIFF WBC: CPT

## 2023-05-30 PROCEDURE — 36415 COLL VENOUS BLD VENIPUNCTURE: CPT

## 2023-05-30 PROCEDURE — 80061 LIPID PANEL: CPT

## 2023-10-16 ENCOUNTER — HOSPITAL ENCOUNTER (OUTPATIENT)
Dept: LAB | Facility: MEDICAL CENTER | Age: 78
End: 2023-10-16
Attending: INTERNAL MEDICINE
Payer: MEDICARE

## 2023-10-16 ENCOUNTER — HOSPITAL ENCOUNTER (OUTPATIENT)
Dept: LAB | Facility: MEDICAL CENTER | Age: 78
End: 2023-10-16
Attending: STUDENT IN AN ORGANIZED HEALTH CARE EDUCATION/TRAINING PROGRAM
Payer: MEDICARE

## 2023-10-16 DIAGNOSIS — M35.9 UNDIFFERENTIATED CONNECTIVE TISSUE DISEASE (HCC): ICD-10-CM

## 2023-10-16 LAB
ALBUMIN SERPL BCP-MCNC: 4.1 G/DL (ref 3.2–4.9)
ALBUMIN SERPL BCP-MCNC: 4.2 G/DL (ref 3.2–4.9)
ALBUMIN/GLOB SERPL: 1.5 G/DL
ALBUMIN/GLOB SERPL: 1.6 G/DL
ALP SERPL-CCNC: 98 U/L (ref 30–99)
ALP SERPL-CCNC: 99 U/L (ref 30–99)
ALT SERPL-CCNC: 35 U/L (ref 2–50)
ALT SERPL-CCNC: 38 U/L (ref 2–50)
ANION GAP SERPL CALC-SCNC: 10 MMOL/L (ref 7–16)
ANION GAP SERPL CALC-SCNC: 9 MMOL/L (ref 7–16)
AST SERPL-CCNC: 26 U/L (ref 12–45)
AST SERPL-CCNC: 28 U/L (ref 12–45)
BASOPHILS # BLD AUTO: 1.1 % (ref 0–1.8)
BASOPHILS # BLD AUTO: 1.4 % (ref 0–1.8)
BASOPHILS # BLD: 0.08 K/UL (ref 0–0.12)
BASOPHILS # BLD: 0.1 K/UL (ref 0–0.12)
BILIRUB SERPL-MCNC: 0.6 MG/DL (ref 0.1–1.5)
BILIRUB SERPL-MCNC: 0.6 MG/DL (ref 0.1–1.5)
BUN SERPL-MCNC: 10 MG/DL (ref 8–22)
BUN SERPL-MCNC: 10 MG/DL (ref 8–22)
C3 SERPL-MCNC: 123.5 MG/DL (ref 87–200)
C4 SERPL-MCNC: 29.2 MG/DL (ref 19–52)
CALCIUM ALBUM COR SERPL-MCNC: 9.4 MG/DL (ref 8.5–10.5)
CALCIUM ALBUM COR SERPL-MCNC: 9.5 MG/DL (ref 8.5–10.5)
CALCIUM SERPL-MCNC: 9.6 MG/DL (ref 8.5–10.5)
CALCIUM SERPL-MCNC: 9.6 MG/DL (ref 8.5–10.5)
CHLORIDE SERPL-SCNC: 97 MMOL/L (ref 96–112)
CHLORIDE SERPL-SCNC: 97 MMOL/L (ref 96–112)
CO2 SERPL-SCNC: 28 MMOL/L (ref 20–33)
CO2 SERPL-SCNC: 29 MMOL/L (ref 20–33)
CREAT SERPL-MCNC: 0.67 MG/DL (ref 0.5–1.4)
CREAT SERPL-MCNC: 0.71 MG/DL (ref 0.5–1.4)
CREAT UR-MCNC: 53.7 MG/DL
CRP SERPL HS-MCNC: 0.89 MG/DL (ref 0–0.75)
EOSINOPHIL # BLD AUTO: 0.1 K/UL (ref 0–0.51)
EOSINOPHIL # BLD AUTO: 0.11 K/UL (ref 0–0.51)
EOSINOPHIL NFR BLD: 1.4 % (ref 0–6.9)
EOSINOPHIL NFR BLD: 1.5 % (ref 0–6.9)
ERYTHROCYTE [DISTWIDTH] IN BLOOD BY AUTOMATED COUNT: 44.1 FL (ref 35.9–50)
ERYTHROCYTE [DISTWIDTH] IN BLOOD BY AUTOMATED COUNT: 45.2 FL (ref 35.9–50)
ERYTHROCYTE [SEDIMENTATION RATE] IN BLOOD BY WESTERGREN METHOD: 8 MM/HOUR (ref 0–25)
FASTING STATUS PATIENT QL REPORTED: NORMAL
FASTING STATUS PATIENT QL REPORTED: NORMAL
FERRITIN SERPL-MCNC: 261 NG/ML (ref 10–291)
GFR SERPLBLD CREATININE-BSD FMLA CKD-EPI: 88 ML/MIN/1.73 M 2
GFR SERPLBLD CREATININE-BSD FMLA CKD-EPI: 91 ML/MIN/1.73 M 2
GLOBULIN SER CALC-MCNC: 2.6 G/DL (ref 1.9–3.5)
GLOBULIN SER CALC-MCNC: 2.8 G/DL (ref 1.9–3.5)
GLUCOSE SERPL-MCNC: 110 MG/DL (ref 65–99)
GLUCOSE SERPL-MCNC: 115 MG/DL (ref 65–99)
HCT VFR BLD AUTO: 42.6 % (ref 37–47)
HCT VFR BLD AUTO: 43 % (ref 37–47)
HGB BLD-MCNC: 13.9 G/DL (ref 12–16)
HGB BLD-MCNC: 14.2 G/DL (ref 12–16)
IMM GRANULOCYTES # BLD AUTO: 0.01 K/UL (ref 0–0.11)
IMM GRANULOCYTES # BLD AUTO: 0.02 K/UL (ref 0–0.11)
IMM GRANULOCYTES NFR BLD AUTO: 0.1 % (ref 0–0.9)
IMM GRANULOCYTES NFR BLD AUTO: 0.3 % (ref 0–0.9)
IRON SATN MFR SERPL: 49 % (ref 15–55)
IRON SERPL-MCNC: 125 UG/DL (ref 40–170)
LYMPHOCYTES # BLD AUTO: 3.04 K/UL (ref 1–4.8)
LYMPHOCYTES # BLD AUTO: 3.13 K/UL (ref 1–4.8)
LYMPHOCYTES NFR BLD: 42.1 % (ref 22–41)
LYMPHOCYTES NFR BLD: 42.4 % (ref 22–41)
MAGNESIUM SERPL-MCNC: 2.2 MG/DL (ref 1.5–2.5)
MCH RBC QN AUTO: 31 PG (ref 27–33)
MCH RBC QN AUTO: 31.4 PG (ref 27–33)
MCHC RBC AUTO-ENTMCNC: 32.3 G/DL (ref 32.2–35.5)
MCHC RBC AUTO-ENTMCNC: 33.3 G/DL (ref 32.2–35.5)
MCV RBC AUTO: 94.2 FL (ref 81.4–97.8)
MCV RBC AUTO: 95.8 FL (ref 81.4–97.8)
MONOCYTES # BLD AUTO: 0.54 K/UL (ref 0–0.85)
MONOCYTES # BLD AUTO: 0.57 K/UL (ref 0–0.85)
MONOCYTES NFR BLD AUTO: 7.3 % (ref 0–13.4)
MONOCYTES NFR BLD AUTO: 7.9 % (ref 0–13.4)
NEUTROPHILS # BLD AUTO: 3.39 K/UL (ref 1.82–7.42)
NEUTROPHILS # BLD AUTO: 3.52 K/UL (ref 1.82–7.42)
NEUTROPHILS NFR BLD: 46.9 % (ref 44–72)
NEUTROPHILS NFR BLD: 47.6 % (ref 44–72)
NRBC # BLD AUTO: 0 K/UL
NRBC # BLD AUTO: 0 K/UL
NRBC BLD-RTO: 0 /100 WBC (ref 0–0.2)
NRBC BLD-RTO: 0 /100 WBC (ref 0–0.2)
PLATELET # BLD AUTO: 293 K/UL (ref 164–446)
PLATELET # BLD AUTO: 294 K/UL (ref 164–446)
PMV BLD AUTO: 9.3 FL (ref 9–12.9)
PMV BLD AUTO: 9.3 FL (ref 9–12.9)
POTASSIUM SERPL-SCNC: 4.1 MMOL/L (ref 3.6–5.5)
POTASSIUM SERPL-SCNC: 4.1 MMOL/L (ref 3.6–5.5)
PROT SERPL-MCNC: 6.8 G/DL (ref 6–8.2)
PROT SERPL-MCNC: 6.9 G/DL (ref 6–8.2)
PROT UR-MCNC: 7 MG/DL (ref 0–15)
PROT/CREAT UR: 130 MG/G (ref 10–107)
RBC # BLD AUTO: 4.49 M/UL (ref 4.2–5.4)
RBC # BLD AUTO: 4.52 M/UL (ref 4.2–5.4)
SODIUM SERPL-SCNC: 135 MMOL/L (ref 135–145)
SODIUM SERPL-SCNC: 135 MMOL/L (ref 135–145)
TIBC SERPL-MCNC: 256 UG/DL (ref 250–450)
UIBC SERPL-MCNC: 131 UG/DL (ref 110–370)
WBC # BLD AUTO: 7.2 K/UL (ref 4.8–10.8)
WBC # BLD AUTO: 7.4 K/UL (ref 4.8–10.8)

## 2023-10-16 PROCEDURE — 80053 COMPREHEN METABOLIC PANEL: CPT | Mod: 91

## 2023-10-16 PROCEDURE — 83540 ASSAY OF IRON: CPT | Mod: GA

## 2023-10-16 PROCEDURE — 85025 COMPLETE CBC W/AUTO DIFF WBC: CPT

## 2023-10-16 PROCEDURE — 84156 ASSAY OF PROTEIN URINE: CPT

## 2023-10-16 PROCEDURE — 36415 COLL VENOUS BLD VENIPUNCTURE: CPT

## 2023-10-16 PROCEDURE — 86160 COMPLEMENT ANTIGEN: CPT

## 2023-10-16 PROCEDURE — 82728 ASSAY OF FERRITIN: CPT | Mod: GA

## 2023-10-16 PROCEDURE — 83735 ASSAY OF MAGNESIUM: CPT

## 2023-10-16 PROCEDURE — 86140 C-REACTIVE PROTEIN: CPT

## 2023-10-16 PROCEDURE — 83550 IRON BINDING TEST: CPT | Mod: GA

## 2023-10-16 PROCEDURE — 82570 ASSAY OF URINE CREATININE: CPT

## 2023-10-16 PROCEDURE — 85025 COMPLETE CBC W/AUTO DIFF WBC: CPT | Mod: 91

## 2023-10-16 PROCEDURE — 80053 COMPREHEN METABOLIC PANEL: CPT

## 2023-10-16 PROCEDURE — 86225 DNA ANTIBODY NATIVE: CPT

## 2023-10-16 PROCEDURE — 85652 RBC SED RATE AUTOMATED: CPT

## 2023-10-18 LAB — DSDNA AB TITR SER CLIF: NORMAL {TITER}

## 2023-12-11 ENCOUNTER — HOSPITAL ENCOUNTER (OUTPATIENT)
Dept: LAB | Facility: MEDICAL CENTER | Age: 78
End: 2023-12-11
Attending: INTERNAL MEDICINE
Payer: MEDICARE

## 2023-12-11 DIAGNOSIS — R76.8 POSITIVE ANA (ANTINUCLEAR ANTIBODY): ICD-10-CM

## 2023-12-11 LAB
ALBUMIN SERPL BCP-MCNC: 4.1 G/DL (ref 3.2–4.9)
ALBUMIN/GLOB SERPL: 1.6 G/DL
ALP SERPL-CCNC: 95 U/L (ref 30–99)
ALT SERPL-CCNC: 59 U/L (ref 2–50)
ANION GAP SERPL CALC-SCNC: 9 MMOL/L (ref 7–16)
AST SERPL-CCNC: 34 U/L (ref 12–45)
BASOPHILS # BLD AUTO: 1 % (ref 0–1.8)
BASOPHILS # BLD: 0.08 K/UL (ref 0–0.12)
BILIRUB SERPL-MCNC: 0.4 MG/DL (ref 0.1–1.5)
BUN SERPL-MCNC: 15 MG/DL (ref 8–22)
CALCIUM ALBUM COR SERPL-MCNC: 9.4 MG/DL (ref 8.5–10.5)
CALCIUM SERPL-MCNC: 9.5 MG/DL (ref 8.5–10.5)
CHLORIDE SERPL-SCNC: 99 MMOL/L (ref 96–112)
CO2 SERPL-SCNC: 28 MMOL/L (ref 20–33)
CREAT SERPL-MCNC: 0.66 MG/DL (ref 0.5–1.4)
CREAT UR-MCNC: 52.73 MG/DL
CRP SERPL HS-MCNC: 0.87 MG/DL (ref 0–0.75)
EOSINOPHIL # BLD AUTO: 0.14 K/UL (ref 0–0.51)
EOSINOPHIL NFR BLD: 1.8 % (ref 0–6.9)
ERYTHROCYTE [DISTWIDTH] IN BLOOD BY AUTOMATED COUNT: 45.6 FL (ref 35.9–50)
GFR SERPLBLD CREATININE-BSD FMLA CKD-EPI: 91 ML/MIN/1.73 M 2
GLOBULIN SER CALC-MCNC: 2.6 G/DL (ref 1.9–3.5)
GLUCOSE SERPL-MCNC: 87 MG/DL (ref 65–99)
HCT VFR BLD AUTO: 41.1 % (ref 37–47)
HGB BLD-MCNC: 13.7 G/DL (ref 12–16)
IMM GRANULOCYTES # BLD AUTO: 0.02 K/UL (ref 0–0.11)
IMM GRANULOCYTES NFR BLD AUTO: 0.3 % (ref 0–0.9)
LYMPHOCYTES # BLD AUTO: 2.45 K/UL (ref 1–4.8)
LYMPHOCYTES NFR BLD: 31.4 % (ref 22–41)
MCH RBC QN AUTO: 32.3 PG (ref 27–33)
MCHC RBC AUTO-ENTMCNC: 33.3 G/DL (ref 32.2–35.5)
MCV RBC AUTO: 96.9 FL (ref 81.4–97.8)
MONOCYTES # BLD AUTO: 0.55 K/UL (ref 0–0.85)
MONOCYTES NFR BLD AUTO: 7 % (ref 0–13.4)
NEUTROPHILS # BLD AUTO: 4.57 K/UL (ref 1.82–7.42)
NEUTROPHILS NFR BLD: 58.5 % (ref 44–72)
NRBC # BLD AUTO: 0 K/UL
NRBC BLD-RTO: 0 /100 WBC (ref 0–0.2)
PLATELET # BLD AUTO: 269 K/UL (ref 164–446)
PMV BLD AUTO: 10 FL (ref 9–12.9)
POTASSIUM SERPL-SCNC: 3.8 MMOL/L (ref 3.6–5.5)
PROT SERPL-MCNC: 6.7 G/DL (ref 6–8.2)
PROT UR-MCNC: 5 MG/DL (ref 0–15)
PROT/CREAT UR: 95 MG/G (ref 10–107)
RBC # BLD AUTO: 4.24 M/UL (ref 4.2–5.4)
SODIUM SERPL-SCNC: 136 MMOL/L (ref 135–145)
WBC # BLD AUTO: 7.8 K/UL (ref 4.8–10.8)

## 2023-12-11 PROCEDURE — 84156 ASSAY OF PROTEIN URINE: CPT

## 2023-12-11 PROCEDURE — 36415 COLL VENOUS BLD VENIPUNCTURE: CPT

## 2023-12-11 PROCEDURE — 85652 RBC SED RATE AUTOMATED: CPT

## 2023-12-11 PROCEDURE — 86160 COMPLEMENT ANTIGEN: CPT

## 2023-12-11 PROCEDURE — 82570 ASSAY OF URINE CREATININE: CPT

## 2023-12-11 PROCEDURE — 86140 C-REACTIVE PROTEIN: CPT

## 2023-12-11 PROCEDURE — 80053 COMPREHEN METABOLIC PANEL: CPT

## 2023-12-11 PROCEDURE — 85025 COMPLETE CBC W/AUTO DIFF WBC: CPT

## 2023-12-12 LAB
C3 SERPL-MCNC: 118.2 MG/DL (ref 87–200)
C4 SERPL-MCNC: 26 MG/DL (ref 19–52)
ERYTHROCYTE [SEDIMENTATION RATE] IN BLOOD BY WESTERGREN METHOD: 13 MM/HOUR (ref 0–25)

## 2023-12-14 ENCOUNTER — HOSPITAL ENCOUNTER (OUTPATIENT)
Dept: RADIOLOGY | Facility: MEDICAL CENTER | Age: 78
End: 2023-12-14
Payer: MEDICARE

## 2023-12-15 ENCOUNTER — TELEPHONE (OUTPATIENT)
Dept: RADIOLOGY | Facility: MEDICAL CENTER | Age: 78
End: 2023-12-15
Payer: MEDICARE

## 2023-12-18 ENCOUNTER — HOSPITAL ENCOUNTER (OUTPATIENT)
Dept: RADIOLOGY | Facility: MEDICAL CENTER | Age: 78
End: 2023-12-18
Attending: SURGERY
Payer: MEDICARE

## 2023-12-18 DIAGNOSIS — C50.111 MALIGNANT NEOPLASM OF CENTRAL PORTION OF RIGHT FEMALE BREAST, UNSPECIFIED ESTROGEN RECEPTOR STATUS (HCC): ICD-10-CM

## 2023-12-18 PROCEDURE — A4648 IMPLANTABLE TISSUE MARKER: HCPCS

## 2024-01-09 ENCOUNTER — PRE-ADMISSION TESTING (OUTPATIENT)
Dept: ADMISSIONS | Facility: MEDICAL CENTER | Age: 79
End: 2024-01-09
Attending: SURGERY
Payer: MEDICARE

## 2024-01-11 ENCOUNTER — HOSPITAL ENCOUNTER (OUTPATIENT)
Dept: LAB | Facility: MEDICAL CENTER | Age: 79
End: 2024-01-11
Attending: SURGERY
Payer: MEDICARE

## 2024-01-11 ENCOUNTER — HOSPITAL ENCOUNTER (OUTPATIENT)
Dept: LAB | Facility: MEDICAL CENTER | Age: 79
End: 2024-01-11
Attending: INTERNAL MEDICINE
Payer: MEDICARE

## 2024-01-11 DIAGNOSIS — Z01.812 PRE-OPERATIVE LABORATORY EXAMINATION: ICD-10-CM

## 2024-01-11 LAB
25(OH)D3 SERPL-MCNC: 36 NG/ML (ref 30–100)
ALBUMIN SERPL BCP-MCNC: 4.3 G/DL (ref 3.2–4.9)
ALBUMIN SERPL BCP-MCNC: 4.3 G/DL (ref 3.2–4.9)
ALBUMIN/GLOB SERPL: 1.5 G/DL
ALBUMIN/GLOB SERPL: 1.6 G/DL
ALP SERPL-CCNC: 105 U/L (ref 30–99)
ALP SERPL-CCNC: 106 U/L (ref 30–99)
ALT SERPL-CCNC: 34 U/L (ref 2–50)
ALT SERPL-CCNC: 35 U/L (ref 2–50)
ANION GAP SERPL CALC-SCNC: 12 MMOL/L (ref 7–16)
ANION GAP SERPL CALC-SCNC: 12 MMOL/L (ref 7–16)
AST SERPL-CCNC: 28 U/L (ref 12–45)
AST SERPL-CCNC: 30 U/L (ref 12–45)
BILIRUB SERPL-MCNC: 0.4 MG/DL (ref 0.1–1.5)
BILIRUB SERPL-MCNC: 0.4 MG/DL (ref 0.1–1.5)
BUN SERPL-MCNC: 11 MG/DL (ref 8–22)
BUN SERPL-MCNC: 11 MG/DL (ref 8–22)
CALCIUM ALBUM COR SERPL-MCNC: 9.5 MG/DL (ref 8.5–10.5)
CALCIUM ALBUM COR SERPL-MCNC: 9.6 MG/DL (ref 8.5–10.5)
CALCIUM SERPL-MCNC: 9.7 MG/DL (ref 8.5–10.5)
CALCIUM SERPL-MCNC: 9.8 MG/DL (ref 8.5–10.5)
CHLORIDE SERPL-SCNC: 100 MMOL/L (ref 96–112)
CHLORIDE SERPL-SCNC: 100 MMOL/L (ref 96–112)
CO2 SERPL-SCNC: 27 MMOL/L (ref 20–33)
CO2 SERPL-SCNC: 27 MMOL/L (ref 20–33)
CREAT SERPL-MCNC: 0.68 MG/DL (ref 0.5–1.4)
CREAT SERPL-MCNC: 0.71 MG/DL (ref 0.5–1.4)
ERYTHROCYTE [DISTWIDTH] IN BLOOD BY AUTOMATED COUNT: 43.7 FL (ref 35.9–50)
FASTING STATUS PATIENT QL REPORTED: NORMAL
GFR SERPLBLD CREATININE-BSD FMLA CKD-EPI: 88 ML/MIN/1.73 M 2
GFR SERPLBLD CREATININE-BSD FMLA CKD-EPI: 90 ML/MIN/1.73 M 2
GLOBULIN SER CALC-MCNC: 2.7 G/DL (ref 1.9–3.5)
GLOBULIN SER CALC-MCNC: 2.8 G/DL (ref 1.9–3.5)
GLUCOSE SERPL-MCNC: 88 MG/DL (ref 65–99)
GLUCOSE SERPL-MCNC: 89 MG/DL (ref 65–99)
HCT VFR BLD AUTO: 41.7 % (ref 37–47)
HGB BLD-MCNC: 14.1 G/DL (ref 12–16)
MCH RBC QN AUTO: 32.4 PG (ref 27–33)
MCHC RBC AUTO-ENTMCNC: 33.8 G/DL (ref 32.2–35.5)
MCV RBC AUTO: 95.9 FL (ref 81.4–97.8)
PLATELET # BLD AUTO: 280 K/UL (ref 164–446)
PMV BLD AUTO: 9.9 FL (ref 9–12.9)
POTASSIUM SERPL-SCNC: 4.3 MMOL/L (ref 3.6–5.5)
POTASSIUM SERPL-SCNC: 4.3 MMOL/L (ref 3.6–5.5)
PROT SERPL-MCNC: 7 G/DL (ref 6–8.2)
PROT SERPL-MCNC: 7.1 G/DL (ref 6–8.2)
RBC # BLD AUTO: 4.35 M/UL (ref 4.2–5.4)
SODIUM SERPL-SCNC: 139 MMOL/L (ref 135–145)
SODIUM SERPL-SCNC: 139 MMOL/L (ref 135–145)
T4 FREE SERPL-MCNC: 0.85 NG/DL (ref 0.93–1.7)
TSH SERPL DL<=0.005 MIU/L-ACNC: 3.21 UIU/ML (ref 0.38–5.33)
WBC # BLD AUTO: 6.2 K/UL (ref 4.8–10.8)

## 2024-01-11 PROCEDURE — 84443 ASSAY THYROID STIM HORMONE: CPT

## 2024-01-11 PROCEDURE — 84439 ASSAY OF FREE THYROXINE: CPT

## 2024-01-11 PROCEDURE — 82306 VITAMIN D 25 HYDROXY: CPT | Mod: GA

## 2024-01-11 PROCEDURE — 36415 COLL VENOUS BLD VENIPUNCTURE: CPT | Mod: GA

## 2024-01-11 PROCEDURE — 80053 COMPREHEN METABOLIC PANEL: CPT | Mod: 91

## 2024-01-11 PROCEDURE — 85027 COMPLETE CBC AUTOMATED: CPT | Mod: GA,91

## 2024-01-11 PROCEDURE — 80053 COMPREHEN METABOLIC PANEL: CPT

## 2024-01-11 PROCEDURE — 85027 COMPLETE CBC AUTOMATED: CPT

## 2024-01-12 LAB
ERYTHROCYTE [DISTWIDTH] IN BLOOD BY AUTOMATED COUNT: 45.2 FL (ref 35.9–50)
HCT VFR BLD AUTO: 42.2 % (ref 37–47)
HGB BLD-MCNC: 14.3 G/DL (ref 12–16)
MCH RBC QN AUTO: 33.1 PG (ref 27–33)
MCHC RBC AUTO-ENTMCNC: 33.9 G/DL (ref 32.2–35.5)
MCV RBC AUTO: 97.7 FL (ref 81.4–97.8)
PLATELET # BLD AUTO: 271 K/UL (ref 164–446)
PMV BLD AUTO: 10.2 FL (ref 9–12.9)
RBC # BLD AUTO: 4.32 M/UL (ref 4.2–5.4)
WBC # BLD AUTO: 6.1 K/UL (ref 4.8–10.8)

## 2024-01-15 ENCOUNTER — APPOINTMENT (OUTPATIENT)
Dept: ADMISSIONS | Facility: MEDICAL CENTER | Age: 79
End: 2024-01-15
Attending: SURGERY
Payer: MEDICARE

## 2024-01-15 ENCOUNTER — OFFICE VISIT (OUTPATIENT)
Dept: ENDOCRINOLOGY | Facility: MEDICAL CENTER | Age: 79
End: 2024-01-15
Attending: INTERNAL MEDICINE
Payer: MEDICARE

## 2024-01-15 VITALS
HEIGHT: 67 IN | WEIGHT: 163 LBS | DIASTOLIC BLOOD PRESSURE: 87 MMHG | BODY MASS INDEX: 25.58 KG/M2 | HEART RATE: 78 BPM | OXYGEN SATURATION: 93 % | SYSTOLIC BLOOD PRESSURE: 170 MMHG

## 2024-01-15 DIAGNOSIS — E03.8 SECONDARY HYPOTHYROIDISM: ICD-10-CM

## 2024-01-15 DIAGNOSIS — R51.9 ACUTE NONINTRACTABLE HEADACHE, UNSPECIFIED HEADACHE TYPE: ICD-10-CM

## 2024-01-15 DIAGNOSIS — E55.9 VITAMIN D DEFICIENCY: ICD-10-CM

## 2024-01-15 DIAGNOSIS — Z01.810 PRE-OPERATIVE CARDIOVASCULAR EXAMINATION: ICD-10-CM

## 2024-01-15 DIAGNOSIS — Z01.812 PRE-OPERATIVE LABORATORY EXAMINATION: ICD-10-CM

## 2024-01-15 DIAGNOSIS — E23.6 RATHKE'S CLEFT CYST (HCC): ICD-10-CM

## 2024-01-15 LAB
EKG IMPRESSION: NORMAL
EKG IMPRESSION: NORMAL

## 2024-01-15 PROCEDURE — 99211 OFF/OP EST MAY X REQ PHY/QHP: CPT | Performed by: INTERNAL MEDICINE

## 2024-01-15 PROCEDURE — 3079F DIAST BP 80-89 MM HG: CPT | Performed by: INTERNAL MEDICINE

## 2024-01-15 PROCEDURE — 99214 OFFICE O/P EST MOD 30 MIN: CPT | Performed by: INTERNAL MEDICINE

## 2024-01-15 PROCEDURE — 93010 ELECTROCARDIOGRAM REPORT: CPT | Performed by: INTERNAL MEDICINE

## 2024-01-15 PROCEDURE — 93005 ELECTROCARDIOGRAM TRACING: CPT

## 2024-01-15 PROCEDURE — 3077F SYST BP >= 140 MM HG: CPT | Performed by: INTERNAL MEDICINE

## 2024-01-15 RX ORDER — ALPRAZOLAM 0.25 MG/1
TABLET ORAL
Qty: 2 TABLET | Refills: 0 | Status: SHIPPED | OUTPATIENT
Start: 2024-01-15 | End: 2024-01-16

## 2024-01-15 ASSESSMENT — FIBROSIS 4 INDEX: FIB4 SCORE: 1.33

## 2024-01-15 NOTE — PROGRESS NOTES
Chief Complaint: Follow up for secondary hypothyroidism of unclear etiology    HPI:     LANG Harrell is a 78 y.o. female here for follow up of newly diagnosed secondary hypothyroidism.  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  Baseline measurement was 8 mm located in the posterior portion of the pituitary.  She has a history of chronic fatigue for the past 7 years    Initial baseline workup for her thyroid showed normal TSH 1.7 with a slightly low free T4 of 0.61 with negative TPO antibodies on Jan 25 2022    Initial baseline anterior pituitary hormone workup showed normal    Normal IGF-I of 122 prolactin of 6.91 ACTH of 18 cortisol 10.7 on October 2022 LH was elevated at 20 and FSH was elevated at 40 and estradiol was low at less than 5 compatible with her postmenopausal state.  Alpha subunit was normal at 0.61    We did not start her on thyroid hormone placement at that time and recommended observation.          Interestingly on follow-up her repeat thyroid labs are still compatible with secondary hypothyroidism.  Her TSH was normal at 3.2 but free T4 was low at 0.85.  She reports that her primary care physician already started on thyroid hormone but she is unsure of the dose but she reports that it is a low-dose of thyroid hormone.  I agree with thyroid hormone replacement therapy but I explained her that I want to verify the dose    She also reports new onset headaches that are causing significant distress and impaired sleep and we discussed getting another pituitary MRI             Patient's medications, allergies, and social histories were reviewed and updated as appropriate.      ROS:     CONS:     No fever, no chills   EYES:     No diplopia, no blurry vision   CV:           No chest pain, no palpitations   PULM:     No SOB, no cough, no hemoptysis.   GI:            No nausea, no vomiting, no diarrhea, no  "constipation   ENDO:     No polyuria, no polydipsia, no heat intolerance, no cold intolerance       Past Medical History:  Problem List:  2022-10: Rathke's cleft cyst (HCC)  2015: History of colon polyps  2015: Osteopenia  2015: Hyperlipidemia  2012: Spinal stenosis of lumbar region  2008: Constipation  2008: Hemorrhoids      Past Surgical History:  Past Surgical History:   Procedure Laterality Date    OTHER  2024    breast reduction three times    TUBAL LIGATION          Allergies:  Patient has no known allergies.     Social History:  Social History     Tobacco Use    Smoking status: Former     Current packs/day: 0.00     Types: Cigarettes     Quit date: 2004     Years since quittin.0     Passive exposure: Never    Smokeless tobacco: Never   Vaping Use    Vaping Use: Never used   Substance Use Topics    Alcohol use: Yes     Alcohol/week: 6.0 - 7.2 oz     Types: 10 - 12 Glasses of wine per week     Comment: Daily    Drug use: Yes     Types: Inhaled     Comment: CBD gummies and smokes marijuana rarely        Family History:   family history is not on file.      PHYSICAL EXAM:   Vital signs: BP (!) 170/87 (BP Location: Left arm, Patient Position: Sitting, BP Cuff Size: Adult)   Pulse 78   Ht 1.702 m (5' 7\")   Wt 73.9 kg (163 lb)   SpO2 93%   BMI 25.53 kg/m²   GENERAL: Well-developed, well-nourished in no apparent distress.   EYE:  No ocular asymmetry, PERRLA  HENT: Pink, moist mucous membranes.    NECK: No thyromegaly.   CARDIOVASCULAR:  No murmurs  LUNGS: Clear breath sounds  ABDOMEN: Soft, nontender   EXTREMITIES: No clubbing, cyanosis, or edema.   NEUROLOGICAL: No gross focal motor abnormalities   LYMPH: No cervical adenopathy palpated.   SKIN: No rashes, lesions.       Labs:  Lab Results   Component Value Date/Time    SODIUM 139 2024 02:24 PM    POTASSIUM 4.3 2024 02:24 PM    CHLORIDE 100 2024 02:24 PM    CO2 27 2024 02:24 PM    ANION 12.0 2024 " "02:24 PM    GLUCOSE 89 01/11/2024 02:24 PM    BUN 11 01/11/2024 02:24 PM    CREATININE 0.68 01/11/2024 02:24 PM    CALCIUM 9.7 01/11/2024 02:24 PM    ASTSGOT 28 01/11/2024 02:24 PM    ALTSGPT 35 01/11/2024 02:24 PM    TBILIRUBIN 0.4 01/11/2024 02:24 PM    ALBUMIN 4.3 01/11/2024 02:24 PM    TOTPROTEIN 7.1 01/11/2024 02:24 PM    GLOBULIN 2.8 01/11/2024 02:24 PM    AGRATIO 1.5 01/11/2024 02:24 PM       Lab Results   Component Value Date/Time    SODIUM 139 01/11/2024 1424    POTASSIUM 4.3 01/11/2024 1424    CHLORIDE 100 01/11/2024 1424    CO2 27 01/11/2024 1424    GLUCOSE 89 01/11/2024 1424    BUN 11 01/11/2024 1424    CREATININE 0.68 01/11/2024 1424    CALCIUM 9.7 01/11/2024 1424    ANION 12.0 01/11/2024 1424       Lab Results   Component Value Date/Time    CHOLSTRLTOT 186 05/30/2023 0726    TRIGLYCERIDE 156 (H) 05/30/2023 0726    HDL 49 05/30/2023 0726     (H) 05/30/2023 0726       Lab Results   Component Value Date/Time    TSHULTRASEN 3.210 01/11/2024 1421     Lab Results   Component Value Date/Time    FREET4 0.85 (L) 01/11/2024 1421     No results found for: \"FREET3\"  No results found for: \"THYSTIMIG\"    No results found for: \"MICROSOMALA\"      Imaging:      ASSESSMENT/PLAN:     1. Secondary hypothyroidism  Unstable her TSH is normal but free T4 is low which is consistent with secondary hypothyroidism  I agree with starting levothyroxine.  She is going to call me and verify the dose that was started by her primary care.  We discussed how to properly take thyroid hormone.    Reviewed proper administration of thyroid hormone  Patient should take thyroid hormone 30-60 minutes before breakfast on an empty stomach plain water and not take it together with food, iron, calcium, and antacids.  Iron, calcium, and antacids should be taken at least 4 hours apart from thyroid hormone.  I will see her again in 3 months with a repeat of her TSH and free T4.    2. Rathke's cleft cyst (HCC)  Unstable because she has a new " headache I am scheduling her for repeat pituitary MRI    3. Vitamin D deficiency  Stable   Vitamin D labs were reviewed with patient  Continue current supplements  Continue monitoring levels       4. Acute nonintractable headache, unspecified headache type  See plan above      Return in about 3 months (around 4/15/2024).      This patient during there office visit today was started on a new medication.  Side effects of the new medication were discussed with the patient today in the office.     Thank you kindly for allowing me to participate in the thyroid care plan for this patient.    Nba Jenkins MD, FACE, [facility]      CC:   Scarlet Kirby D.O.

## 2024-01-18 ENCOUNTER — ANESTHESIA EVENT (OUTPATIENT)
Dept: SURGERY | Facility: MEDICAL CENTER | Age: 79
End: 2024-01-18
Payer: MEDICARE

## 2024-01-19 ENCOUNTER — ANESTHESIA (OUTPATIENT)
Dept: SURGERY | Facility: MEDICAL CENTER | Age: 79
End: 2024-01-19
Payer: MEDICARE

## 2024-01-19 ENCOUNTER — HOSPITAL ENCOUNTER (OUTPATIENT)
Facility: MEDICAL CENTER | Age: 79
End: 2024-01-19
Attending: SURGERY | Admitting: SURGERY
Payer: MEDICARE

## 2024-01-19 ENCOUNTER — APPOINTMENT (OUTPATIENT)
Dept: RADIOLOGY | Facility: MEDICAL CENTER | Age: 79
End: 2024-01-19
Attending: SURGERY
Payer: MEDICARE

## 2024-01-19 VITALS
BODY MASS INDEX: 24.81 KG/M2 | WEIGHT: 158.07 LBS | HEART RATE: 72 BPM | OXYGEN SATURATION: 94 % | HEIGHT: 67 IN | RESPIRATION RATE: 16 BRPM | DIASTOLIC BLOOD PRESSURE: 69 MMHG | SYSTOLIC BLOOD PRESSURE: 135 MMHG | TEMPERATURE: 97.6 F

## 2024-01-19 DIAGNOSIS — Z17.0 MALIGNANT NEOPLASM OF CENTRAL PORTION OF RIGHT BREAST IN FEMALE, ESTROGEN RECEPTOR POSITIVE (HCC): ICD-10-CM

## 2024-01-19 DIAGNOSIS — G89.18 POSTOPERATIVE PAIN: ICD-10-CM

## 2024-01-19 DIAGNOSIS — C50.111 MALIGNANT NEOPLASM OF CENTRAL PORTION OF RIGHT BREAST IN FEMALE, ESTROGEN RECEPTOR POSITIVE (HCC): ICD-10-CM

## 2024-01-19 LAB — PATHOLOGY CONSULT NOTE: NORMAL

## 2024-01-19 PROCEDURE — 160035 HCHG PACU - 1ST 60 MINS PHASE I: Performed by: SURGERY

## 2024-01-19 PROCEDURE — 700102 HCHG RX REV CODE 250 W/ 637 OVERRIDE(OP): Performed by: ANESTHESIOLOGY

## 2024-01-19 PROCEDURE — 700101 HCHG RX REV CODE 250: Performed by: SURGERY

## 2024-01-19 PROCEDURE — 160048 HCHG OR STATISTICAL LEVEL 1-5: Performed by: SURGERY

## 2024-01-19 PROCEDURE — 160046 HCHG PACU - 1ST 60 MINS PHASE II: Performed by: SURGERY

## 2024-01-19 PROCEDURE — 700111 HCHG RX REV CODE 636 W/ 250 OVERRIDE (IP): Mod: JZ | Performed by: ANESTHESIOLOGY

## 2024-01-19 PROCEDURE — 76098 X-RAY EXAM SURGICAL SPECIMEN: CPT | Mod: RT

## 2024-01-19 PROCEDURE — 160029 HCHG SURGERY MINUTES - 1ST 30 MINS LEVEL 4: Performed by: SURGERY

## 2024-01-19 PROCEDURE — 700101 HCHG RX REV CODE 250: Performed by: ANESTHESIOLOGY

## 2024-01-19 PROCEDURE — A9270 NON-COVERED ITEM OR SERVICE: HCPCS | Performed by: ANESTHESIOLOGY

## 2024-01-19 PROCEDURE — 700111 HCHG RX REV CODE 636 W/ 250 OVERRIDE (IP): Mod: JG | Performed by: SURGERY

## 2024-01-19 PROCEDURE — 160002 HCHG RECOVERY MINUTES (STAT): Performed by: SURGERY

## 2024-01-19 PROCEDURE — 160041 HCHG SURGERY MINUTES - EA ADDL 1 MIN LEVEL 4: Performed by: SURGERY

## 2024-01-19 PROCEDURE — 160009 HCHG ANES TIME/MIN: Performed by: SURGERY

## 2024-01-19 PROCEDURE — 38792 RA TRACER ID OF SENTINL NODE: CPT | Mod: RT

## 2024-01-19 PROCEDURE — 160047 HCHG PACU  - EA ADDL 30 MINS PHASE II: Performed by: SURGERY

## 2024-01-19 PROCEDURE — 700105 HCHG RX REV CODE 258: Performed by: SURGERY

## 2024-01-19 PROCEDURE — 88307 TISSUE EXAM BY PATHOLOGIST: CPT

## 2024-01-19 PROCEDURE — 160025 RECOVERY II MINUTES (STATS): Performed by: SURGERY

## 2024-01-19 RX ORDER — ISOSULFAN BLUE 50 MG/5ML
INJECTION, SOLUTION SUBCUTANEOUS
Status: DISCONTINUED
Start: 2024-01-19 | End: 2024-01-19 | Stop reason: HOSPADM

## 2024-01-19 RX ORDER — ONDANSETRON 2 MG/ML
INJECTION INTRAMUSCULAR; INTRAVENOUS PRN
Status: DISCONTINUED | OUTPATIENT
Start: 2024-01-19 | End: 2024-01-19 | Stop reason: SURG

## 2024-01-19 RX ORDER — BUPIVACAINE HYDROCHLORIDE AND EPINEPHRINE 5; 5 MG/ML; UG/ML
INJECTION, SOLUTION EPIDURAL; INTRACAUDAL; PERINEURAL
Status: DISCONTINUED
Start: 2024-01-19 | End: 2024-01-19 | Stop reason: HOSPADM

## 2024-01-19 RX ORDER — HYDROMORPHONE HYDROCHLORIDE 1 MG/ML
0.4 INJECTION, SOLUTION INTRAMUSCULAR; INTRAVENOUS; SUBCUTANEOUS
Status: DISCONTINUED | OUTPATIENT
Start: 2024-01-19 | End: 2024-01-19 | Stop reason: HOSPADM

## 2024-01-19 RX ORDER — BUPIVACAINE HYDROCHLORIDE AND EPINEPHRINE 5; 5 MG/ML; UG/ML
INJECTION, SOLUTION PERINEURAL
Status: DISCONTINUED | OUTPATIENT
Start: 2024-01-19 | End: 2024-01-19 | Stop reason: HOSPADM

## 2024-01-19 RX ORDER — ACETAMINOPHEN 500 MG
1000 TABLET ORAL ONCE
Status: COMPLETED | OUTPATIENT
Start: 2024-01-19 | End: 2024-01-19

## 2024-01-19 RX ORDER — SODIUM CHLORIDE, SODIUM LACTATE, POTASSIUM CHLORIDE, CALCIUM CHLORIDE 600; 310; 30; 20 MG/100ML; MG/100ML; MG/100ML; MG/100ML
INJECTION, SOLUTION INTRAVENOUS CONTINUOUS
Status: DISCONTINUED | OUTPATIENT
Start: 2024-01-19 | End: 2024-01-19 | Stop reason: HOSPADM

## 2024-01-19 RX ORDER — OXYCODONE HCL 5 MG/5 ML
5 SOLUTION, ORAL ORAL
Status: DISCONTINUED | OUTPATIENT
Start: 2024-01-19 | End: 2024-01-19 | Stop reason: HOSPADM

## 2024-01-19 RX ORDER — HALOPERIDOL 5 MG/ML
1 INJECTION INTRAMUSCULAR
Status: DISCONTINUED | OUTPATIENT
Start: 2024-01-19 | End: 2024-01-19 | Stop reason: HOSPADM

## 2024-01-19 RX ORDER — LIDOCAINE HYDROCHLORIDE 20 MG/ML
INJECTION, SOLUTION EPIDURAL; INFILTRATION; INTRACAUDAL; PERINEURAL PRN
Status: DISCONTINUED | OUTPATIENT
Start: 2024-01-19 | End: 2024-01-19 | Stop reason: SURG

## 2024-01-19 RX ORDER — ISOSULFAN BLUE 50 MG/5ML
INJECTION, SOLUTION SUBCUTANEOUS
Status: DISCONTINUED | OUTPATIENT
Start: 2024-01-19 | End: 2024-01-19 | Stop reason: HOSPADM

## 2024-01-19 RX ORDER — HYDROMORPHONE HYDROCHLORIDE 1 MG/ML
0.1 INJECTION, SOLUTION INTRAMUSCULAR; INTRAVENOUS; SUBCUTANEOUS
Status: DISCONTINUED | OUTPATIENT
Start: 2024-01-19 | End: 2024-01-19 | Stop reason: HOSPADM

## 2024-01-19 RX ORDER — LABETALOL HYDROCHLORIDE 5 MG/ML
5 INJECTION, SOLUTION INTRAVENOUS
Status: DISCONTINUED | OUTPATIENT
Start: 2024-01-19 | End: 2024-01-19 | Stop reason: HOSPADM

## 2024-01-19 RX ORDER — ONDANSETRON 2 MG/ML
4 INJECTION INTRAMUSCULAR; INTRAVENOUS
Status: DISCONTINUED | OUTPATIENT
Start: 2024-01-19 | End: 2024-01-19 | Stop reason: HOSPADM

## 2024-01-19 RX ORDER — HYDROMORPHONE HYDROCHLORIDE 1 MG/ML
0.2 INJECTION, SOLUTION INTRAMUSCULAR; INTRAVENOUS; SUBCUTANEOUS
Status: DISCONTINUED | OUTPATIENT
Start: 2024-01-19 | End: 2024-01-19 | Stop reason: HOSPADM

## 2024-01-19 RX ORDER — EPHEDRINE SULFATE 50 MG/ML
5 INJECTION, SOLUTION INTRAVENOUS
Status: DISCONTINUED | OUTPATIENT
Start: 2024-01-19 | End: 2024-01-19 | Stop reason: HOSPADM

## 2024-01-19 RX ORDER — CEFAZOLIN SODIUM 1 G/3ML
INJECTION, POWDER, FOR SOLUTION INTRAMUSCULAR; INTRAVENOUS PRN
Status: DISCONTINUED | OUTPATIENT
Start: 2024-01-19 | End: 2024-01-19 | Stop reason: SURG

## 2024-01-19 RX ORDER — LIDOCAINE AND PRILOCAINE 25; 25 MG/G; MG/G
CREAM TOPICAL ONCE
Status: COMPLETED | OUTPATIENT
Start: 2024-01-19 | End: 2024-01-19

## 2024-01-19 RX ORDER — OXYCODONE HCL 5 MG/5 ML
10 SOLUTION, ORAL ORAL
Status: DISCONTINUED | OUTPATIENT
Start: 2024-01-19 | End: 2024-01-19 | Stop reason: HOSPADM

## 2024-01-19 RX ORDER — DEXAMETHASONE SODIUM PHOSPHATE 4 MG/ML
INJECTION, SOLUTION INTRA-ARTICULAR; INTRALESIONAL; INTRAMUSCULAR; INTRAVENOUS; SOFT TISSUE PRN
Status: DISCONTINUED | OUTPATIENT
Start: 2024-01-19 | End: 2024-01-19 | Stop reason: SURG

## 2024-01-19 RX ORDER — HYDROCODONE BITARTRATE AND ACETAMINOPHEN 5; 325 MG/1; MG/1
1-2 TABLET ORAL EVERY 6 HOURS PRN
Qty: 30 TABLET | Refills: 0 | Status: SHIPPED | OUTPATIENT
Start: 2024-01-19 | End: 2024-01-26

## 2024-01-19 RX ORDER — DIPHENHYDRAMINE HYDROCHLORIDE 50 MG/ML
12.5 INJECTION INTRAMUSCULAR; INTRAVENOUS
Status: DISCONTINUED | OUTPATIENT
Start: 2024-01-19 | End: 2024-01-19 | Stop reason: HOSPADM

## 2024-01-19 RX ORDER — KETOROLAC TROMETHAMINE 30 MG/ML
INJECTION, SOLUTION INTRAMUSCULAR; INTRAVENOUS PRN
Status: DISCONTINUED | OUTPATIENT
Start: 2024-01-19 | End: 2024-01-19 | Stop reason: SURG

## 2024-01-19 RX ORDER — HYDRALAZINE HYDROCHLORIDE 20 MG/ML
5 INJECTION INTRAMUSCULAR; INTRAVENOUS
Status: DISCONTINUED | OUTPATIENT
Start: 2024-01-19 | End: 2024-01-19 | Stop reason: HOSPADM

## 2024-01-19 RX ADMIN — PROPOFOL 120 MG: 10 INJECTION, EMULSION INTRAVENOUS at 10:31

## 2024-01-19 RX ADMIN — PROPOFOL 50 MG: 10 INJECTION, EMULSION INTRAVENOUS at 10:43

## 2024-01-19 RX ADMIN — ONDANSETRON 4 MG: 2 INJECTION INTRAMUSCULAR; INTRAVENOUS at 10:51

## 2024-01-19 RX ADMIN — FENTANYL CITRATE 100 MCG: 50 INJECTION, SOLUTION INTRAMUSCULAR; INTRAVENOUS at 10:44

## 2024-01-19 RX ADMIN — FENTANYL CITRATE 50 MCG: 50 INJECTION, SOLUTION INTRAMUSCULAR; INTRAVENOUS at 10:38

## 2024-01-19 RX ADMIN — ACETAMINOPHEN 1000 MG: 500 TABLET ORAL at 08:29

## 2024-01-19 RX ADMIN — DEXAMETHASONE SODIUM PHOSPHATE 8 MG: 4 INJECTION INTRA-ARTICULAR; INTRALESIONAL; INTRAMUSCULAR; INTRAVENOUS; SOFT TISSUE at 10:37

## 2024-01-19 RX ADMIN — LIDOCAINE HYDROCHLORIDE 100 MG: 20 INJECTION, SOLUTION EPIDURAL; INFILTRATION; INTRACAUDAL at 10:30

## 2024-01-19 RX ADMIN — CEFAZOLIN 2 G: 1 INJECTION, POWDER, FOR SOLUTION INTRAMUSCULAR; INTRAVENOUS at 10:27

## 2024-01-19 RX ADMIN — SODIUM CHLORIDE, POTASSIUM CHLORIDE, SODIUM LACTATE AND CALCIUM CHLORIDE: 600; 310; 30; 20 INJECTION, SOLUTION INTRAVENOUS at 10:22

## 2024-01-19 RX ADMIN — KETOROLAC TROMETHAMINE 15 MG: 30 INJECTION, SOLUTION INTRAMUSCULAR; INTRAVENOUS at 11:00

## 2024-01-19 RX ADMIN — SODIUM CHLORIDE, POTASSIUM CHLORIDE, SODIUM LACTATE AND CALCIUM CHLORIDE: 600; 310; 30; 20 INJECTION, SOLUTION INTRAVENOUS at 11:09

## 2024-01-19 RX ADMIN — LIDOCAINE AND PRILOCAINE 1 APPLICATION: 25; 25 CREAM TOPICAL at 08:29

## 2024-01-19 RX ADMIN — PROPOFOL 30 MG: 10 INJECTION, EMULSION INTRAVENOUS at 10:32

## 2024-01-19 RX ADMIN — FENTANYL CITRATE 50 MCG: 50 INJECTION, SOLUTION INTRAMUSCULAR; INTRAVENOUS at 10:28

## 2024-01-19 RX ADMIN — FENTANYL CITRATE 50 MCG: 50 INJECTION, SOLUTION INTRAMUSCULAR; INTRAVENOUS at 10:57

## 2024-01-19 ASSESSMENT — PAIN DESCRIPTION - PAIN TYPE
TYPE: SURGICAL PAIN

## 2024-01-19 ASSESSMENT — FIBROSIS 4 INDEX
FIB4 SCORE: 1.33
FIB4 SCORE: 1.33

## 2024-01-19 NOTE — ANESTHESIA POSTPROCEDURE EVALUATION
Patient: LANG Harrell    Procedure Summary       Date: 01/19/24 Room / Location: [facility] ROOM 23 / [facility]ERY [facility]    Anesthesia Start: 1022 Anesthesia Stop: 1124    Procedures:       RIGHT PARTIAL MASTECTOMY WITH RIGHT AXILLARY SENTINEL LYMPH NODE BIOPSY (Right: Breast)      BIOPSY, LYMPH NODE, SENTINEL (Right: Axilla) Diagnosis: (Malignant neoplasm of central portion of right female breast,)    Surgeons: Corry Matthews M.D. Responsible Provider: Mayank Irvin M.D.    Anesthesia Type: general ASA Status: 2            Final Anesthesia Type: general  Last vitals  BP   Blood Pressure : 135/69    Temp   36.4 °C (97.6 °F)    Pulse   72   Resp   16    SpO2   94 %      Anesthesia Post Evaluation    Patient location during evaluation: PACU  Patient participation: complete - patient participated  Level of consciousness: awake and alert    Airway patency: patent  Anesthetic complications: no  Cardiovascular status: hemodynamically stable  Respiratory status: acceptable  Hydration status: euvolemic    PONV: none          No notable events documented.     Nurse Pain Score: 0 (NPRS)

## 2024-01-19 NOTE — ANESTHESIA PREPROCEDURE EVALUATION
Case: [id number] Date/Time: 01/19/24 1015    Procedures:       RIGHT PARTIAL MASTECTOMY WITH RIGHT AXILLARY SENTINEL LYMPH NODE BIOPSY AND POSSIBLE RIGHT AXILLARY DISSECTION (Right)      BIOPSY, LYMPH NODE, SENTINEL    Pre-op diagnosis: Malignant neoplasm of central portion of right female breast,    Location: [facility] ROOM 23 / SURGERY SAME DAY [facility]    Surgeons: Corry Matthews M.D.            Relevant Problems   Other   (positive) Hyperlipidemia       Physical Exam    Airway   Mallampati: II  TM distance: >3 FB  Neck ROM: full       Cardiovascular - normal exam  Rhythm: regular  Rate: normal  (-) murmur     Dental - normal exam           Pulmonary - normal exam  Breath sounds clear to auscultation     Abdominal    Neurological - normal exam                   Anesthesia Plan    ASA 2       Plan - general       Airway plan will be LMA          Induction: intravenous    Postoperative Plan: Postoperative administration of opioids is intended.    Pertinent diagnostic labs and testing reviewed    Informed Consent:    Anesthetic plan and risks discussed with patient.    Use of blood products discussed with: patient whom consented to blood products.

## 2024-01-19 NOTE — ANESTHESIA TIME REPORT
Anesthesia Start and Stop Event Times       Date Time Event    1/19/2024 1012 Ready for Procedure     1022 Anesthesia Start     1124 Anesthesia Stop          Responsible Staff  01/19/24      Name Role Begin End    Mayank Irvin M.D. Anesth 1022 1124          Overtime Reason:  no overtime (within assigned shift)    Comments:

## 2024-01-19 NOTE — H&P
"Surgical History and Physical    Date: 2024    PCP: Scarlet Kirby D.O.  Attending Physician: Corry Matthews M.D. [facility]    CC: \"here for surgery\"    HPI: This is a 78 y.o. female who is presenting with right breast cancer, here for surgery.     Past Medical History:   Diagnosis Date    Anesthesia 2024    severe headache    Bowel habit changes 2024    diarrhea    Cancer (HCC) 2024    breast cancer    Cataract 2024    IOL    Depression 2024    on medication    High cholesterol 2024    on medication    Macular degeneration     Psychiatric problem     Restless leg syndrome        Past Surgical History:   Procedure Laterality Date    OTHER  2024    breast reduction three times    TUBAL LIGATION         Current Facility-Administered Medications   Medication Dose Route Frequency Provider Last Rate Last Admin    lidocaine (Xylocaine) 1 % injection 0.5 mL  0.5 mL Intradermal Once PRN Corry Matthews M.D.        lactated ringers infusion   Intravenous Continuous Corry Matthews M.D.           Social History     Socioeconomic History    Marital status:      Spouse name: Not on file    Number of children: Not on file    Years of education: Not on file    Highest education level: Not on file   Occupational History    Not on file   Tobacco Use    Smoking status: Former     Current packs/day: 0.00     Types: Cigarettes     Quit date: 2004     Years since quittin.0     Passive exposure: Never    Smokeless tobacco: Never   Vaping Use    Vaping Use: Never used   Substance and Sexual Activity    Alcohol use: Yes     Alcohol/week: 6.0 - 7.2 oz     Types: 10 - 12 Glasses of wine per week     Comment: Daily    Drug use: Yes     Types: Inhaled     Comment: CBD gummies and smokes marijuana rarely    Sexual activity: Not on file   Other Topics Concern    Not on file   Social History Narrative    Not on file     Social Determinants of Health     Financial Resource " "Strain: Not on file   Food Insecurity: Not on file   Transportation Needs: Not on file   Physical Activity: Not on file   Stress: Not on file   Social Connections: Not on file   Intimate Partner Violence: Not on file   Housing Stability: Not on file       History reviewed. No pertinent family history.    Allergies:  Patient has no known allergies.    Review of Systems:  Constitutional: Negative for fever, chills, weight loss, malaise/fatigue and diaphoresis.   HENT: Negative for hearing loss, ear pain, nosebleeds, congestion, sore throat, neck pain, tinnitus and ear discharge.    Eyes: Negative for blurred vision, double vision, photophobia, pain, discharge and redness.   Respiratory: Negative for cough, hemoptysis, sputum production, shortness of breath, wheezing and stridor.    Cardiovascular: Negative for chest pain, palpitations, orthopnea, claudication, leg swelling and PND.   Gastrointestinal: Negative for heartburn, nausea, vomiting, abdominal pain, diarrhea, constipation, blood in stool and melena.   Genitourinary: Negative for dysuria, urgency, frequency, hematuria and flank pain.   Musculoskeletal: Negative for myalgias, back pain, joint pain and falls.   Skin: Negative for itching and rash.  Neurological: Negative for dizziness, tingling, tremors, sensory change, speech change, focal weakness, seizures, loss of consciousness, weakness and headaches.   Endo/Heme/Allergies: Negative for environmental allergies and polydipsia. Does not bruise/bleed easily.   Psychiatric/Behavioral: Negative for depression, suicidal ideas, hallucinations, memory loss and substance abuse. The patient is not nervous/anxious and does not have insomnia.    Physical Exam:  BP (!) 197/89   Pulse 61   Temp 36.6 °C (97.8 °F) (Temporal)   Resp 18   Ht 1.702 m (5' 7\")   Wt 71.7 kg (158 lb 1.1 oz)   SpO2 94%     Constitutional: she is oriented to person, place, and time.  she appears well-developed and well-nourished. No distress. "   Head: Normocephalic and atraumatic.   Neck: Normal range of motion. Neck supple. No JVD present. No tracheal deviation present. No thyromegaly present.   Cardiovascular: Normal rate, regular rhythm, normal heart sounds and intact distal pulses.  Exam reveals no gallop and no friction rub.  No murmur heard.  Pulmonary/Chest: Effort normal and breath sounds normal. No stridor. No respiratory distress. she has no wheezes. she has no rales.   Abdominal: Soft. Bowel sounds are normal. she exhibits no distension and no mass. There is no tenderness. There is no rebound and no guarding.   Musculoskeletal: Normal range of motion. she exhibits no edema and no tenderness.   Neurological: she is alert and oriented to person, place, and time. she has normal reflexes. No cranial nerve deficit. Coordination normal.   Skin: Skin is warm and dry. No rash noted. she is not diaphoretic. No erythema. No pallor.   Psychiatric: she has a normal mood and affect.  Behavior is normal.       Assessment: This is a 78 y.o. female here for surgery.     Plan: right partial mastectomy and sentinel lymph node biopsy    Corry Matthews M.D.  [facility]  [phone]

## 2024-01-19 NOTE — ANESTHESIA PROCEDURE NOTES
Airway    Date/Time: 1/19/2024 10:32 AM    Performed by: Mayank Irvin M.D.  Authorized by: Mayank Irvin M.D.    Location:  OR  Urgency:  Elective  Difficult Airway: No    Indications for Airway Management:  Anesthesia      Spontaneous Ventilation: absent    Sedation Level:  Deep  Preoxygenated: Yes    Mask Difficulty Assessment:  0 - not attempted  Final Airway Type:  Supraglottic airway  Final Supraglottic Airway:  Standard LMA    SGA Size:  4  Number of Attempts at Approach:  1

## 2024-01-19 NOTE — OR NURSING
1121 patient arrival from OR; report received from MD and RN. Patient attached to monitor and VSS with patient on 4L O2 via mask. Surgical site to right breast closed with dermabond and is clean, dry, and intact at this time. Surgical site to right axilla closed with dermabond and is clean, dry, and intact at this time.     1138 Report to CHRISTIAN Gottlieb. Care transferred at this time.

## 2024-01-19 NOTE — OR NURSING
1138 Report received from Gina GONZALES and care assumed at this time. Right breast incisions with derma bond visualized; clean dry and intact. Patient denies any needs at this time.     1140 Friend Autumn updated via phone call, all questions answered. Will call when patient ready for discharge.     1309 Pt sitting up, using incentive spirometer. Vital signs stable on room air, pt tolerating po sips. Denies pain or nausea at this time. Called katherine Leyva for discharge instructions, awaiting call back.

## 2024-01-19 NOTE — DISCHARGE INSTRUCTIONS
Discharge Instructions for Lumpectomy and [place] Lymph Node Biospy:     On the day of surgery we will be removing the lump of your breast cancer (lumpectomy) and through a separate incision under your arm we will be taking out the 2-3 lymph nodes that drain your breast (sentinel lymph node dissection).     Wound Care  You will have 2 incisions: 1) on your breast (lumpectomy) and 2) under your arm (sentinel lymph node biopsy).  All of your stitches are buried under the skin and dissolveable. There are no sutures to remove.   You can shower the day after your surgery and get your wound wet (it will be sealed by the waterproof dressings)  You may have some bruising after surgery. This is normal.  There may be a small amount of drainage from your wounds, this is usually normal and nothing to worry about. Place a dry gauze or bandage (available at any drug store) on the wound to absorb any drainage.  You can remove the dressing 2 days after surgery.      For Pain  Wear your bra as much as possible including to bed. The less your breast moves the less it will hurt.  You may take Tylenol or Advil every 4-6 hours as directed on the bottle.  You will be given a prescription for more severe pain. You can use it as needed.  Last pain medication given at   [place] prescription has been sent to your preferred pharmacy: [facility] in [place]     Work  If you would like, you may return to work the day after your biopsy.  If you need a work excuse for the day of surgery or for any days thereafter, please let us know.     When to call the doctor  If you have severe, uncontrolled pain at the biopsy site.  If you run at fever of 100.5?F or higher within a few days of the biopsy.  If you have a large amount of drainage that is soaking the bandage more than once a day.  If the area around your wound becomes red/inflamed.  Make sure you schedule and attend all follow-up visits with your doctor. You should have a follow up appointment in  about a week after surgery.     If you have any additional questions, please do not hesitate to call the office and speak to either myself or the physician on call.     Office address:  [address] [place] [address] [id number]  [place] [address]           Corry Matthews MD  [facility]  [phone]            If any questions arise, call your provider.  If your provider is not available, please feel free to call the Surgical Center at [phone].      What to Expect Post Anesthesia    Rest and take it easy for the first 24 hours.  A responsible adult is recommended to remain with you during that time.  It is normal to feel sleepy.  We encourage you to not do anything that requires balance, judgment or coordination.    FOR 24 HOURS DO NOT:  Drive, operate machinery or run household appliances.  Drink beer or alcoholic beverages.  Make important decisions or sign legal documents.    To avoid nausea, slowly advance diet as tolerated, avoiding spicy or greasy foods for the first day.  Add more substantial food to your diet according to your provider's instructions.  Babies can be fed formula or breast milk as soon as they are hungry.  INCREASE FLUIDS AND FIBER TO AVOID CONSTIPATION.    MILD FLU-LIKE SYMPTOMS ARE NORMAL.  YOU MAY EXPERIENCE GENERALIZED MUSCLE ACHES, THROAT IRRITATION, HEADACHE AND/OR SOME NAUSEA.

## 2024-01-19 NOTE — PROGRESS NOTES
Discharge Instructions for Lumpectomy and [place] Lymph Node Biospy:    On the day of surgery we will be removing the lump of your breast cancer (lumpectomy) and through a separate incision under your arm we will be taking out the 2-3 lymph nodes that drain your breast (sentinel lymph node dissection).    Wound Care  You will have 2 incisions: 1) on your breast (lumpectomy) and 2) under your arm (sentinel lymph node biopsy).  All of your stitches are buried under the skin and dissolveable. There are no sutures to remove.   You can shower the day after your surgery and get your wound wet (it will be sealed by the waterproof dressings)  You may have some bruising after surgery. This is normal.  There may be a small amount of drainage from your wounds, this is usually normal and nothing to worry about. Place a dry gauze or bandage (available at any drug store) on the wound to absorb any drainage.  You can remove the dressing 2 days after surgery.     For Pain  Wear your bra as much as possible including to bed. The less your breast moves the less it will hurt.  You may take Tylenol or Advil every 4-6 hours as directed on the bottle.  You will be given a prescription for more severe pain. You can use it as needed.    Work  If you would like, you may return to work the day after your biopsy.  If you need a work excuse for the day of surgery or for any days thereafter, please let us know.    When to call the doctor  If you have severe, uncontrolled pain at the biopsy site.  If you run at fever of 100.5?F or higher within a few days of the biopsy.  If you have a large amount of drainage that is soaking the bandage more than once a day.  If the area around your wound becomes red/inflamed.  Make sure you schedule and attend all follow-up visits with your doctor. You should have a follow up appointment in about a week after surgery.    If you have any additional questions, please do not hesitate to call the office and speak  to either myself or the physician on call.    Office address:  [address] [place] [address] [address]  [place] [address]        Corry Matthews MD  [facility]  [phone]

## 2024-01-19 NOTE — OP REPORT
Operative Report    Date: 1/19/2024    Surgeon: Corry Matthews M.D.    Assistant: Deedee MOSQUERA    My assistant was medically necessary for this procedure. She injected the patient with 5 cc of methylene blue in order to facilitate identification of the sentinel lymph nodes, retracted the tissues necessary so I could perform the sentinel lymph node biopsy, assisted me in achieving hemostasis, and assisted in incisional closure.     Anesthesiologist: Albaro DYE     Pre-operative Diagnosis:  C50.111 Malignant neoplasm of central portion of right female breast    Post-operative Diagnosis: same     Procedure: right breast HEAVEN localized partial mastectomy, right axillary sentinel lymph node biopsy, injection for identification of sentinel lymph node    15748 Mastectomy, partial   50096 Biopsy or excision of lymph node(s); open, deep axillary node(s)  76755 Intraoperative identification (eg, mapping) of sentinel lymph node(s) includes injection of non-radioactive dye    Findings:  HEAVEN and biopsy clips within excised specimen.    Procedure in detail: The patient was identified in the pre-operative holding area and brought to the operating room. Prior to the procedure, she underwent HEAVEN  localization with radiology due to the non-palpable nature of the lesion. As well, she underwent radionucleotide injection by nuclear medicine.    Correct side and site were identified. Pre-operative antibiotics of ancef were administered prior to the procedure. Anesthesia was smoothly induced.    I injected 5 cc of methylene blue under the areola of the right breast, and the breast was then massaged briefly. The patient was then prepped and draped in the usual sterile fashion.    I then turned my attention to the partial mastectomy. The skin was infiltrated with local anesthetic and a curvilinear incision was made.The breast tissue was grasped with Allis clamps and a core of tissue was removed around the HEAVEN. The specimen  was then completely removed, marked with suture for orientation, and was sent for specimen radiography. I confirmed the mass and clip and nilson to be within the excised specimen.    Meticulous hemostasis was achieved with electrocautery. The area was irrigated and suctioned. The skin was closed in two layers with vicryl and monocryl.     I proceeded with the sentinel lymph node biopsy portion of the procedure. A transverse incision was made in the lower end of the axilla after anesthetizing the skin. The incision was carried deeper into the axillary tissue and a blue-stained lymphatic was identified and traced distally to a  lymph node, which was partially blue stained and highly radioactive. This was dissected free from its surrounding tissue.     There was a second blue-stained node found deep  to that, which also was radioactive. These were sent to pathology for permanent pathologic exam. The residual radioactivity in the surgical bed was <10% of the initial count.     After ensuring hemostasis, and irrigating the wound, I then closed the wound in two layers with vicryl and monocryl.     Sterile dressings were applied.     The patient was awakened from anesthetic, and was taken to the recovery room in stable condition.    Sponge and needle counts were correct at the end of the case.     Specimen:   1. right axillary sentinel lymph node tissue  2. right breast partial mastecomy    EBL: minimal    Dispo: stable, extubated, to PACU    Corry Matthews M.D.  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  C50.01 Malignant neoplasm of nipple and areola, female  C50.011 Malignant neoplasm of nipple and areola, right female breast  C50.012 Malignant neoplasm of nipple and areola, left female breast  C50.019 Malignant neoplasm of nipple and areola, unspecified female breast  C50.02 Malignant neoplasm of nipple and areola, male  C50.021 Malignant neoplasm of nipple and areola, right male  breast  C50.022 Malignant neoplasm of nipple and areola, left male breast  C50.029 Malignant neoplasm of nipple and areola, unspecified male breast    C50.1 Malignant neoplasm of central portion of breast  C50.11 Malignant neoplasm of central portion of breast, female    C50.112 Malignant neoplasm of central portion of left female breast  C50.119 Malignant neoplasm of central portion of unspecified female breast  C50.12 Malignant neoplasm of central portion of breast, male  C50.121 Malignant neoplasm of central portion of right male breast  C50.122 Malignant neoplasm of central portion of left male breast  C50.129 Malignant neoplasm of central portion of unspecified male breast    C50.2 Malignant neoplasm of upper-inner quadrant of breast  C50.21 Malignant neoplasm of upper-inner quadrant of breast, female  C50.211 Malignant neoplasm of upper-inner quadrant of right female breast  C50.212 Malignant neoplasm of upper-inner quadrant of left female breast  C50.219 Malignant neoplasm of upper-inner quadrant of unspecified female breast  C50.22 Malignant neoplasm of upper-inner quadrant of breast, male  C50.221 Malignant neoplasm of upper-inner quadrant of right male breast  C50.222 Malignant neoplasm of upper-inner quadrant of left male breast  C50.229 Malignant neoplasm of upper-inner quadrant of unspecified male breast    C50.3 Malignant neoplasm of lower-inner quadrant of breast  C50.31 Malignant neoplasm of lower-inner quadrant of breast, female  C50.311 Malignant neoplasm of lower-inner quadrant of right female breast  C50.312 Malignant neoplasm of lower-inner quadrant of left female breast  C50.319 Malignant neoplasm of lower-inner quadrant of unspecified female breast  C50.32 Malignant neoplasm of lower-inner quadrant of breast, male  C50.321 Malignant neoplasm of lower-inner quadrant of right male breast  C50.322 Malignant neoplasm of lower-inner quadrant of left male breast  C50.329 Malignant neoplasm of  lower-inner quadrant of unspecified male breast    C50.4 Malignant neoplasm of upper-outer quadrant of breast  C50.41 Malignant neoplasm of upper-outer quadrant of breast, female  C50.411 Malignant neoplasm of upper-outer quadrant of right female breast  C50.412 Malignant neoplasm of upper-outer quadrant of left female breast  C50.419 Malignant neoplasm of upper-outer quadrant of unspecified female breast  C50.42 Malignant neoplasm of upper-outer quadrant of breast, male  C50.421 Malignant neoplasm of upper-outer quadrant of right male breast  C50.422 Malignant neoplasm of upper-outer quadrant of left male breast  C50.429 Malignant neoplasm of upper-outer quadrant of unspecified male breast    C50.5 Malignant neoplasm of lower-outer quadrant of breast  C50.51 Malignant neoplasm of lower-outer quadrant of breast, female  C50.511 Malignant neoplasm of lower-outer quadrant of right female breast  C50.512 Malignant neoplasm of lower-outer quadrant of left female breast  C50.519 Malignant neoplasm of lower-outer quadrant of unspecified female breast  C50.52 Malignant neoplasm of lower-outer quadrant of breast, male  C50.521 Malignant neoplasm of lower-outer quadrant of right male breast  C50.522 Malignant neoplasm of lower-outer quadrant of left male breast  C50.529 Malignant neoplasm of lower-outer quadrant of unspecified male breast    C50.6 Malignant neoplasm of axillary tail of breast  C50.61 Malignant neoplasm of axillary tail of breast, female  C50.611 Malignant neoplasm of axillary tail of right female breast  C50.612 Malignant neoplasm of axillary tail of left female breast  C50.619 Malignant neoplasm of axillary tail of unspecified female breast  C50.62 Malignant neoplasm of axillary tail of breast, male  C50.621 Malignant neoplasm of axillary tail of right male breast  C50.622 Malignant neoplasm of axillary tail of left male breast  C50.629 Malignant neoplasm of axillary tail of unspecified male breast    C50.8  Malignant neoplasm of overlapping sites of breast  C50.81 Malignant neoplasm of overlapping sites of breast, female  C50.811 Malignant neoplasm of overlapping sites of right female breast  C50.812 Malignant neoplasm of overlapping sites of left female breast  C50.819 Malignant neoplasm of overlapping sites of unspecified female breast  C50.82 Malignant neoplasm of overlapping sites of breast, male  C50.821 Malignant neoplasm of overlapping sites of right male breast  C50.822 Malignant neoplasm of overlapping sites of left male breast  C50.829 Malignant neoplasm of overlapping sites of unspecified male breast    37152 Axillary lymphadenectomy; complete  92852 Mastectomy, partial   71659 Mastectomy, partial, with axillary lymphadenectomy  23363 Mastectomy, simple, complete  68845 Biopsy of breast; open, incisional  38437 Mastectomy, modified radical, including axillary lymph nodes, with or without pectoralis minor muscle, but excluding pectoralis major muscle  47765 Excision of cyst, fibroadenoma, or other benign or malignant tumor, aberrant breast tissue, duct lesion, nipple or areolar lesion (except 51032), open, male or female, 1 or more lesions  75639 Excision of breast lesion identified by preoperative placement of radiological marker, open; single lesion  39560 Biopsy or excision of lymph node(s); open, deep axillary node(s)  62543 Intraoperative identification (eg, mapping) of sentinel lymph node(s) includes injection of non-radioactive dye

## 2024-01-22 ENCOUNTER — HOSPITAL ENCOUNTER (OUTPATIENT)
Dept: RADIOLOGY | Facility: MEDICAL CENTER | Age: 79
End: 2024-01-22
Attending: INTERNAL MEDICINE
Payer: MEDICARE

## 2024-01-22 DIAGNOSIS — E23.6 RATHKE'S CLEFT CYST (HCC): ICD-10-CM

## 2024-01-22 PROCEDURE — 70553 MRI BRAIN STEM W/O & W/DYE: CPT

## 2024-01-22 PROCEDURE — 700117 HCHG RX CONTRAST REV CODE 255: Performed by: INTERNAL MEDICINE

## 2024-01-22 PROCEDURE — A9579 GAD-BASE MR CONTRAST NOS,1ML: HCPCS | Performed by: INTERNAL MEDICINE

## 2024-01-22 RX ADMIN — GADOTERIDOL 15 ML: 279.3 INJECTION, SOLUTION INTRAVENOUS at 16:25

## 2024-01-26 ENCOUNTER — OFFICE VISIT (OUTPATIENT)
Dept: URGENT CARE | Facility: PHYSICIAN GROUP | Age: 79
End: 2024-01-26
Payer: MEDICARE

## 2024-01-26 VITALS
WEIGHT: 160 LBS | SYSTOLIC BLOOD PRESSURE: 148 MMHG | TEMPERATURE: 98.2 F | HEIGHT: 67 IN | BODY MASS INDEX: 25.11 KG/M2 | OXYGEN SATURATION: 95 % | RESPIRATION RATE: 16 BRPM | HEART RATE: 81 BPM | DIASTOLIC BLOOD PRESSURE: 88 MMHG

## 2024-01-26 DIAGNOSIS — T81.89XA PROBLEM INVOLVING SURGICAL INCISION: ICD-10-CM

## 2024-01-26 PROCEDURE — 3077F SYST BP >= 140 MM HG: CPT | Performed by: NURSE PRACTITIONER

## 2024-01-26 PROCEDURE — 3079F DIAST BP 80-89 MM HG: CPT | Performed by: NURSE PRACTITIONER

## 2024-01-26 PROCEDURE — 99213 OFFICE O/P EST LOW 20 MIN: CPT | Performed by: NURSE PRACTITIONER

## 2024-01-26 RX ORDER — CEPHALEXIN 500 MG/1
500 CAPSULE ORAL 4 TIMES DAILY
Qty: 28 CAPSULE | Refills: 0 | Status: SHIPPED | OUTPATIENT
Start: 2024-01-26 | End: 2024-02-02

## 2024-01-26 ASSESSMENT — FIBROSIS 4 INDEX: FIB4 SCORE: 1.33

## 2024-01-27 NOTE — PROGRESS NOTES
"Subjective:   S JAVI Harrell is a 78 y.o. female who presents for Post-op Problem (Pt had surgery last Friday, partial mastectomy, lymph nodes swelling, feeling lumps, redness on wounds /)    Patient is a 78-year-old female who presents clinic today for concerns that her surgical site from partial mastectomy with right axillary lymph node removal is not healing appropriately.  Surgery was performed on 1/19/2024.  Patient denies any complications during surgery.  She states that the incision site is mildly erythematous and tender to the touch, the area under her right axillary region and feels loose and possibly fluid-filled.  She has not had any fevers, chills, or headache.  She denies any discharge or drainage from the site.  Patient has been applying Polysporin, aloe vera gel, and hydrocortisone cream to the surgical sites.    Medications, Allergies, and current problem list reviewed today in Epic.     Objective:     BP (!) 148/88   Pulse 81   Temp 36.8 °C (98.2 °F) (Temporal)   Resp 16   Ht 1.702 m (5' 7\")   Wt 72.6 kg (160 lb)   SpO2 95%     Physical Exam  Vitals reviewed.   Constitutional:       General: She is not in acute distress.     Appearance: Normal appearance. She is not ill-appearing or toxic-appearing.   HENT:      Head: Normocephalic.      Nose: Nose normal.      Mouth/Throat:      Mouth: Mucous membranes are moist.   Eyes:      Extraocular Movements: Extraocular movements intact.      Conjunctiva/sclera: Conjunctivae normal.      Pupils: Pupils are equal, round, and reactive to light.   Cardiovascular:      Rate and Rhythm: Normal rate.   Pulmonary:      Effort: Pulmonary effort is normal.   Chest:          Comments: Right axillary: 4 cm surgical incision, normal wound healing, surgical borders are pink, edges are approximated.  No surrounding erythema.  No discharge or drainage.  Tenderness is reported with palpation.  Mild swelling noted and bruising.  Right breast: 4 cm surgical incision, " small dehiscing at lateral edge, no discharge or drainage.  Surgical incision borders are pink in color, no surrounding erythema.  No discharge or drainage.  Mild tenderness reported to palpation.    Musculoskeletal:         General: Normal range of motion.      Cervical back: Normal range of motion and neck supple.   Skin:     General: Skin is warm and dry.   Neurological:      Mental Status: She is alert and oriented to person, place, and time.   Psychiatric:         Mood and Affect: Mood normal.         Behavior: Behavior normal.         Thought Content: Thought content normal.         Judgment: Judgment normal.         Assessment/Plan:     Diagnosis and associated orders:     1. Problem involving surgical incision  cephALEXin (KEFLEX) 500 MG Cap         Comments/MDM:     This is acute condition.  Right axillary surgical incision shows normal wound healing.  Right breast incision has tiny/small dehiscing of surgical site, no discharge or drainage.  No surrounding erythema around either incision.  Patient does have tenderness to palpation however this is to be expected.  Areas were cleaned with Betadine swabs and nonadhesive dressing with paper tape was applied.  Recommended patient swab area with Betadine swabs once daily for the next 2 to 3 days and apply dressing changes.  Recommended intermittent open to air.  Paper prescription of cephalexin was provided in case surrounding erythema arises or signs of infection occur.  Patient does have postop appointment on Tuesday.  Patient was involved with shared decision-making throughout the exam today and verbalizes understanding regards to plan of care, discharge instructions, and follow-up         Differential diagnosis, natural history, supportive care, and indications for immediate follow-up discussed.    Advised the patient to follow-up with the primary care physician for recheck, reevaluation, and consideration of further management.    I personally reviewed  prior external notes and test results pertinent to today's visit as well as additional imaging and testing completed in clinic today.     Please note that this dictation was created using voice recognition software. I have made a reasonable attempt to correct obvious errors, but I expect that there are errors of grammar and possibly content that I did not discover before finalizing the note.

## 2024-02-05 PROBLEM — Z17.0 MALIGNANT NEOPLASM OF UPPER-OUTER QUADRANT OF RIGHT BREAST IN FEMALE, ESTROGEN RECEPTOR POSITIVE (HCC): Status: ACTIVE | Noted: 2024-02-05

## 2024-02-05 PROBLEM — C50.411 MALIGNANT NEOPLASM OF UPPER-OUTER QUADRANT OF RIGHT BREAST IN FEMALE, ESTROGEN RECEPTOR POSITIVE (HCC): Status: ACTIVE | Noted: 2024-02-05

## 2024-02-06 ENCOUNTER — PATIENT OUTREACH (OUTPATIENT)
Dept: ONCOLOGY | Facility: MEDICAL CENTER | Age: 79
End: 2024-02-06
Payer: MEDICARE

## 2024-02-06 NOTE — PROGRESS NOTES
Oncology Nurse Navigation  Intro call to pt.  She is scheduled for radiation consult with Dr. Peters on 2/12/24 at 0900.  She lives in [place] and denies transportation barriers.  Intro letter and resources mailed.

## 2024-02-06 NOTE — LETTER
[facility]    [address] [place] [address] [address]  Phone: [phone] - Fax: [phone]              LANG Harrell  [address] [place] [id number]     Date: 02/06/24  Medical Record Number: [id number]    Dear Kim,    I am a Cancer Nurse Navigator, a certified oncology nurse. My role is to assess any needs you may have with education, guidance and support. I am available to you and your family through your treatment at [facility].       I am available to address your needs during your journey with the following services:     Care Coordination  I can assist you in facilitating communication between your cancer care treatment team to ensure timely treatment and follow-up.  I can also assist with transition of care back to your primary care provider, or other specialist, as needed.  My goal is to bridge gaps for you throughout the course of your active treatment.       Education Services  Understanding the recommended treatment course by your physician is key. I can provide educational resources personalized to your cancer diagnosis to help you understand your diagnosis and treatment. Please let me know if you would like to receive information about your diagnosis and treatment plan.  I am here to help.     Support Services/Resource Information  [facility] we offer a full scope of support services.  I can assist you with referral information to:  Cancer Clinical Trials & Research  Nutrition counseling  Support groups  Complementary Therapies such as Mind-Body Techniques Meditation  Patient Financial Advocates    [facility], an [employer] affiliate office, our volunteers can assist you with accessing our [employer] library, support services information, head coverings and comfort items  Community and national resources, included eligibility based zenia assistance and pharmaceutical access programs, if you are in need of additional  information.     [facility] offers services that include:  Behavioral Health  Genetic counseling & testing  Acupuncture  Lymphedema prevention/treatment program  Palliative care services.        I hope you have an excellent patient experience.  Please feel free to share with me your comments regarding the care you have received- we value your feedback.    Sincerely,     Camila Osullivan R.N.  Cancer Nurse Navigator    Office: [phone] / [phone]   Email:  [email or web address]

## 2024-02-12 ENCOUNTER — HOSPITAL ENCOUNTER (OUTPATIENT)
Dept: RADIATION ONCOLOGY | Facility: MEDICAL CENTER | Age: 79
End: 2024-02-29
Attending: RADIOLOGY
Payer: MEDICARE

## 2024-02-12 VITALS
BODY MASS INDEX: 24.86 KG/M2 | SYSTOLIC BLOOD PRESSURE: 195 MMHG | WEIGHT: 158.73 LBS | HEART RATE: 64 BPM | OXYGEN SATURATION: 92 % | DIASTOLIC BLOOD PRESSURE: 100 MMHG

## 2024-02-12 DIAGNOSIS — C50.411 MALIGNANT NEOPLASM OF UPPER-OUTER QUADRANT OF RIGHT BREAST IN FEMALE, ESTROGEN RECEPTOR POSITIVE (HCC): ICD-10-CM

## 2024-02-12 DIAGNOSIS — Z17.0 MALIGNANT NEOPLASM OF UPPER-OUTER QUADRANT OF RIGHT BREAST IN FEMALE, ESTROGEN RECEPTOR POSITIVE (HCC): ICD-10-CM

## 2024-02-12 PROCEDURE — 99214 OFFICE O/P EST MOD 30 MIN: CPT | Performed by: RADIOLOGY

## 2024-02-12 PROCEDURE — 99205 OFFICE O/P NEW HI 60 MIN: CPT | Performed by: RADIOLOGY

## 2024-02-12 ASSESSMENT — PAIN SCALES - GENERAL: PAINLEVEL: NO PAIN

## 2024-02-12 ASSESSMENT — FIBROSIS 4 INDEX: FIB4 SCORE: 1.33

## 2024-02-12 NOTE — CONSULTS
RADIATION ONCOLOGY CONSULT    DATE OF SERVICE: 2/12/2024    IDENTIFICATION: A 78 y.o. 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  ER/MD greater than 95 HER2 negative Ki-67 3%.  Negative margins.  She is here at the kind request of Dr. Corry Matthews.      HISTORY OF PRESENT ILLNESS:  patient has a long history of abnormal mammograms on the right breast.  More recently on 10/16/2023 the upper outer quadrant right breast was noted to have an increase in the asymmetry that they had been following.   Diagnostic mammogram and ultrasound were done on 10/20/2023 confirming in the superior central right breast a small spiculated mass 7.5 mm.  She then underwent a biopsy on 11/16/2023.   This revealed an infiltrating ductal carcinoma grade 1 ER/MD positive HER2 1+ Ki-67 3%.  She then underwent a lumpectomy and sentinel node dissection on 1/19/2024.  This  revealed a 0.9 mm mass with widely negative margins 7 nodes were taken out all were negative.   Postoperatively she has been doing well.  She is here to explore the option of radiation therapy to decrease her chance of local regional recurrence.  She is already seen Dr. Kt Conn who has given her an aromatase inhibitor to take after she receives radiation if she  decides to get radiation therapy.    PAST MEDICAL HISTORY:   Past Medical History:   Diagnosis Date    Anesthesia 01/09/2024    severe headache    Bowel habit changes 01/09/2024    diarrhea    Cancer (HCC) 01/09/2024    breast cancer    Cataract 01/09/2024    IOL    Depression 01/09/2024    on medication    High cholesterol 01/09/2024    on medication    Macular degeneration     Malignant neoplasm of upper-outer quadrant of right breast in female, estrogen receptor positive (HCC)     Psychiatric problem     Restless leg syndrome        PAST SURGICAL HISTORY:  Past Surgical History:   Procedure Laterality Date    PB  MASTECTOMY, PARTIAL Right 1/19/2024    Procedure: RIGHT PARTIAL MASTECTOMY WITH RIGHT AXILLARY SENTINEL LYMPH NODE BIOPSY;  Surgeon: Corry Matthews M.D.;  Location: SURGERY SAME DAY [facility];  Service: General    [place] BIOPSY/EXCISION, LYMPH NODE(S) Right 1/19/2024    Procedure: BIOPSY, LYMPH NODE, SENTINEL;  Surgeon: Corry Matthews M.D.;  Location: SURGERY SAME DAY [facility];  Service: General    OTHER  01/09/2024    breast reduction three times    TUBAL LIGATION         GYNECOLOGICAL STATUS:  G3, P2    CURRENT MEDICATIONS:  Current Outpatient Medications   Medication Sig Dispense Refill    Omega-3 Fatty Acids (FISH OIL PO) Take 1 Capsule by mouth every day.      ropinirole (REQUIP) 5 MG tablet Take 5 mg by mouth every evening.      DULoxetine (CYMBALTA) 30 MG Cap DR Particles Take 1 Capsule by mouth every day. 90 Capsule 1    artificial tears (EYE LUBRICANT) Ointment ophth ointment Apply 1 Application to both eyes every 8 hours.      ezetimibe (ZETIA) 10 MG Tab Take 10 mg by mouth every day.      gabapentin (NEURONTIN) 300 MG Cap Take 300 mg by mouth 2 times a day. 1 tab am, 2 tabs pm      Multiple Vitamins-Minerals (PRESERVISION AREDS) Cap Take 1 Capsule by mouth 2 times a day. OTC      Calcium-Magnesium-Zinc 333-133-5 MG Tab Take 1 Tablet by mouth every evening. OTC       No current facility-administered medications for this encounter.       ALLERGIES:    Patient has no known allergies.    FAMILY HISTORY:    Family History   Problem Relation Age of Onset    Cancer Father         Lung- Smoker   [unfilled]        SOCIAL HISTORY:     reports that she quit smoking about 64 years ago. Her smoking use included cigarettes. She has never been exposed to tobacco smoke. She has never used smokeless tobacco. She reports current alcohol use of about 6.0 - 7.2 oz of alcohol per week. She reports current drug use. Drug: Inhaled.  Patient is a 78 year old female who resides in [place], [place] with her Spouse. She is retired from  [employer] and [employer].     REVIEW OF SYSTEMS: Pertinent positives consist of    Decrease in appetite some fatigue since her diagnosis.  The rest of the review of systems is negative and has been reviewed.     PAIN:   Patient reports no acute/ chronic pain       PHYSICAL EXAM:    0= Fully active, able to carry on all pre-disease performance without restriction.  Vitals:    02/12/24 0918   BP: (!) 195/100   BP Location: Left arm   Patient Position: Sitting   BP Cuff Size: Adult   Pulse: 64   SpO2: 92%   Weight: 72 kg (158 lb 11.7 oz)   Pain Score: No pain        GENERAL:   Well-appearing alert and oriented x 3 in no apparent distress  BREASTS:  bilaterally symmetrical evidence of reduction mammoplasty done years ago.  Recent evidence of right lumpectomy and sentinel node dissection scars.  Postoperative induration is minimal excellent cosmesis.  There is no palpable mass in either breast or axilla.  HEENT:  Pupils are equal, round, and reactive to light.  Extraocular muscles   are intact. Sclerae nonicteric.  Conjunctivae pink.  Oral cavity, tongue   protrudes midline.   NECK:   No peripheral adenopathy of the neck, supraclavicular fossa or axillae   bilaterally.  LUNGS:  Clear to ascultation   HEART:  Regular rate and rhythm.  No murmur appreciated  ABDOMEN:  Soft. No evidence of hepatosplenomegaly.    EXTREMITIES:  Without Edema.  NEUROLOGIC:  Cranial nerves II through XII were intact. Normal stance and gait motor and sensory grossly within normal limits                IMPRESSION:    A 78 y.o. with  [id number] stage 1A   invasive grade 2 ductal carcinomaof the right breast status postlumpectomy and sentinel node dissection.  ER/TX greater than 95 HER2 negative Ki-67 3%.  Negative margins.      RECOMMENDATIONS:    I had a long discussion with the patient and her friend regarding diagnosis prognosis and treatment.  We discussed using an aromatase inhibitor alone; we discussed giving radiation therapy alone and we  discussed giving both.   We then discussed different fractionation schemes of radiation therapy and decided on 5 fractions if she did decide to embark on the treatment.  I've described the details of radiation along with the side effects both acute and chronic, including but not exclusive to fatigue, skin reaction, local soreness, swelling, delayed healing, scarring fibrosis discoloration. Ample time was allowed for questions, and patient understands.      After discussing all the options the patient at this time would like to do a trial of an aromatase inhibitor.  She is going to see my partner Dr. Lopez her in 3 months to see how she is doing on  the aromatase inhibitor and readdress radiation.    Thank you for the opportunity to participate in her care.  If any questions or comments, please do not hesitate in calling.    Please note that this dictation was created using voice recognition software. I have made every reasonable attempt to correct obvious errors, but I expect that there are errors of grammar and possibly content that I did not discover before finalizing the note.

## 2024-02-12 NOTE — PROGRESS NOTES
Patient was seen today in clinic with Dr. Peters for consultation.  Vitals signs and weight were obtained and pain assessment was completed.  Allergies and medications were reviewed with the patient.      Vitals/Pain:  Vitals:    02/12/24 0918   BP: (!) 195/100   BP Location: Left arm   Patient Position: Sitting   BP Cuff Size: Adult   Pulse: 64   SpO2: 92%   Weight: 72 kg (158 lb 11.7 oz)   Pain Score: No pain        Allergies:   Patient has no known allergies.    Current Medications:  Current Outpatient Medications   Medication Sig Dispense Refill    Omega-3 Fatty Acids (FISH OIL PO) Take 1 Capsule by mouth every day.      ropinirole (REQUIP) 5 MG tablet Take 5 mg by mouth every evening.      DULoxetine (CYMBALTA) 30 MG Cap DR Particles Take 1 Capsule by mouth every day. 90 Capsule 1    artificial tears (EYE LUBRICANT) Ointment ophth ointment Apply 1 Application to both eyes every 8 hours.      ezetimibe (ZETIA) 10 MG Tab Take 10 mg by mouth every day.      gabapentin (NEURONTIN) 300 MG Cap Take 300 mg by mouth 2 times a day. 1 tab am, 2 tabs pm      Multiple Vitamins-Minerals (PRESERVISION AREDS) Cap Take 1 Capsule by mouth 2 times a day. OTC      Calcium-Magnesium-Zinc 333-133-5 MG Tab Take 1 Tablet by mouth every evening. OTC       No current facility-administered medications for this encounter.         PCP:  Mirta Medrano R.N.

## 2024-03-04 ENCOUNTER — HOSPITAL ENCOUNTER (OUTPATIENT)
Dept: LAB | Facility: MEDICAL CENTER | Age: 79
End: 2024-03-04
Attending: STUDENT IN AN ORGANIZED HEALTH CARE EDUCATION/TRAINING PROGRAM
Payer: MEDICARE

## 2024-03-04 ENCOUNTER — HOSPITAL ENCOUNTER (OUTPATIENT)
Dept: LAB | Facility: MEDICAL CENTER | Age: 79
End: 2024-03-04
Attending: INTERNAL MEDICINE
Payer: MEDICARE

## 2024-03-04 DIAGNOSIS — E03.8 SECONDARY HYPOTHYROIDISM: ICD-10-CM

## 2024-03-04 DIAGNOSIS — E23.6 RATHKE'S CLEFT CYST (HCC): ICD-10-CM

## 2024-03-04 DIAGNOSIS — R51.9 ACUTE NONINTRACTABLE HEADACHE, UNSPECIFIED HEADACHE TYPE: ICD-10-CM

## 2024-03-04 DIAGNOSIS — E55.9 VITAMIN D DEFICIENCY: ICD-10-CM

## 2024-03-04 LAB
25(OH)D3 SERPL-MCNC: 30 NG/ML (ref 30–100)
ALBUMIN SERPL BCP-MCNC: 3.9 G/DL (ref 3.2–4.9)
ALBUMIN SERPL BCP-MCNC: 3.9 G/DL (ref 3.2–4.9)
ALBUMIN/GLOB SERPL: 1.6 G/DL
ALBUMIN/GLOB SERPL: 1.6 G/DL
ALP SERPL-CCNC: 107 U/L (ref 30–99)
ALP SERPL-CCNC: 109 U/L (ref 30–99)
ALT SERPL-CCNC: 28 U/L (ref 2–50)
ALT SERPL-CCNC: 29 U/L (ref 2–50)
ANION GAP SERPL CALC-SCNC: 10 MMOL/L (ref 7–16)
ANION GAP SERPL CALC-SCNC: 10 MMOL/L (ref 7–16)
AST SERPL-CCNC: 23 U/L (ref 12–45)
AST SERPL-CCNC: 26 U/L (ref 12–45)
BASOPHILS # BLD AUTO: 1.5 % (ref 0–1.8)
BASOPHILS # BLD: 0.09 K/UL (ref 0–0.12)
BILIRUB SERPL-MCNC: 0.5 MG/DL (ref 0.1–1.5)
BILIRUB SERPL-MCNC: 0.6 MG/DL (ref 0.1–1.5)
BUN SERPL-MCNC: 9 MG/DL (ref 8–22)
BUN SERPL-MCNC: 9 MG/DL (ref 8–22)
CALCIUM ALBUM COR SERPL-MCNC: 9.3 MG/DL (ref 8.5–10.5)
CALCIUM ALBUM COR SERPL-MCNC: 9.4 MG/DL (ref 8.5–10.5)
CALCIUM SERPL-MCNC: 9.2 MG/DL (ref 8.5–10.5)
CALCIUM SERPL-MCNC: 9.3 MG/DL (ref 8.5–10.5)
CHLORIDE SERPL-SCNC: 101 MMOL/L (ref 96–112)
CHLORIDE SERPL-SCNC: 102 MMOL/L (ref 96–112)
CO2 SERPL-SCNC: 26 MMOL/L (ref 20–33)
CO2 SERPL-SCNC: 26 MMOL/L (ref 20–33)
CREAT SERPL-MCNC: 0.72 MG/DL (ref 0.5–1.4)
CREAT SERPL-MCNC: 0.73 MG/DL (ref 0.5–1.4)
EOSINOPHIL # BLD AUTO: 0.18 K/UL (ref 0–0.51)
EOSINOPHIL NFR BLD: 2.9 % (ref 0–6.9)
ERYTHROCYTE [DISTWIDTH] IN BLOOD BY AUTOMATED COUNT: 45.1 FL (ref 35.9–50)
FASTING STATUS PATIENT QL REPORTED: NORMAL
FASTING STATUS PATIENT QL REPORTED: NORMAL
FERRITIN SERPL-MCNC: 153 NG/ML (ref 10–291)
GFR SERPLBLD CREATININE-BSD FMLA CKD-EPI: 85 ML/MIN/1.73 M 2
GFR SERPLBLD CREATININE-BSD FMLA CKD-EPI: 86 ML/MIN/1.73 M 2
GLOBULIN SER CALC-MCNC: 2.5 G/DL (ref 1.9–3.5)
GLOBULIN SER CALC-MCNC: 2.5 G/DL (ref 1.9–3.5)
GLUCOSE SERPL-MCNC: 78 MG/DL (ref 65–99)
GLUCOSE SERPL-MCNC: 87 MG/DL (ref 65–99)
HCT VFR BLD AUTO: 40.8 % (ref 37–47)
HGB BLD-MCNC: 13.9 G/DL (ref 12–16)
IMM GRANULOCYTES # BLD AUTO: 0.01 K/UL (ref 0–0.11)
IMM GRANULOCYTES NFR BLD AUTO: 0.2 % (ref 0–0.9)
IRON SATN MFR SERPL: 30 % (ref 15–55)
IRON SERPL-MCNC: 78 UG/DL (ref 40–170)
LYMPHOCYTES # BLD AUTO: 2.22 K/UL (ref 1–4.8)
LYMPHOCYTES NFR BLD: 35.9 % (ref 22–41)
MAGNESIUM SERPL-MCNC: 2.2 MG/DL (ref 1.5–2.5)
MCH RBC QN AUTO: 32.6 PG (ref 27–33)
MCHC RBC AUTO-ENTMCNC: 34.1 G/DL (ref 32.2–35.5)
MCV RBC AUTO: 95.6 FL (ref 81.4–97.8)
MONOCYTES # BLD AUTO: 0.58 K/UL (ref 0–0.85)
MONOCYTES NFR BLD AUTO: 9.4 % (ref 0–13.4)
NEUTROPHILS # BLD AUTO: 3.11 K/UL (ref 1.82–7.42)
NEUTROPHILS NFR BLD: 50.1 % (ref 44–72)
NRBC # BLD AUTO: 0 K/UL
NRBC BLD-RTO: 0 /100 WBC (ref 0–0.2)
PLATELET # BLD AUTO: 301 K/UL (ref 164–446)
PMV BLD AUTO: 9.8 FL (ref 9–12.9)
POTASSIUM SERPL-SCNC: 4.7 MMOL/L (ref 3.6–5.5)
POTASSIUM SERPL-SCNC: 4.7 MMOL/L (ref 3.6–5.5)
PROT SERPL-MCNC: 6.4 G/DL (ref 6–8.2)
PROT SERPL-MCNC: 6.4 G/DL (ref 6–8.2)
RBC # BLD AUTO: 4.27 M/UL (ref 4.2–5.4)
SODIUM SERPL-SCNC: 137 MMOL/L (ref 135–145)
SODIUM SERPL-SCNC: 138 MMOL/L (ref 135–145)
T4 FREE SERPL-MCNC: 0.97 NG/DL (ref 0.93–1.7)
T4 FREE SERPL-MCNC: 1.03 NG/DL (ref 0.93–1.7)
TIBC SERPL-MCNC: 256 UG/DL (ref 250–450)
TSH SERPL DL<=0.005 MIU/L-ACNC: 0.63 UIU/ML (ref 0.38–5.33)
TSH SERPL DL<=0.005 MIU/L-ACNC: 0.63 UIU/ML (ref 0.38–5.33)
UIBC SERPL-MCNC: 178 UG/DL (ref 110–370)
WBC # BLD AUTO: 6.2 K/UL (ref 4.8–10.8)

## 2024-03-04 PROCEDURE — 84443 ASSAY THYROID STIM HORMONE: CPT | Mod: 91

## 2024-03-04 PROCEDURE — 83550 IRON BINDING TEST: CPT

## 2024-03-04 PROCEDURE — 83540 ASSAY OF IRON: CPT

## 2024-03-04 PROCEDURE — 82728 ASSAY OF FERRITIN: CPT

## 2024-03-04 PROCEDURE — 84439 ASSAY OF FREE THYROXINE: CPT | Mod: 91

## 2024-03-04 PROCEDURE — 82306 VITAMIN D 25 HYDROXY: CPT

## 2024-03-04 PROCEDURE — 84443 ASSAY THYROID STIM HORMONE: CPT

## 2024-03-04 PROCEDURE — 80053 COMPREHEN METABOLIC PANEL: CPT

## 2024-03-04 PROCEDURE — 83735 ASSAY OF MAGNESIUM: CPT

## 2024-03-04 PROCEDURE — 84439 ASSAY OF FREE THYROXINE: CPT

## 2024-03-04 PROCEDURE — 80053 COMPREHEN METABOLIC PANEL: CPT | Mod: 91

## 2024-03-04 PROCEDURE — 36415 COLL VENOUS BLD VENIPUNCTURE: CPT

## 2024-03-04 PROCEDURE — 85025 COMPLETE CBC W/AUTO DIFF WBC: CPT

## 2024-04-16 ENCOUNTER — OFFICE VISIT (OUTPATIENT)
Dept: ENDOCRINOLOGY | Facility: MEDICAL CENTER | Age: 79
End: 2024-04-16
Attending: INTERNAL MEDICINE
Payer: MEDICARE

## 2024-04-16 VITALS
DIASTOLIC BLOOD PRESSURE: 78 MMHG | SYSTOLIC BLOOD PRESSURE: 122 MMHG | BODY MASS INDEX: 25.51 KG/M2 | HEIGHT: 67 IN | WEIGHT: 162.5 LBS | OXYGEN SATURATION: 95 % | HEART RATE: 70 BPM

## 2024-04-16 DIAGNOSIS — E55.9 VITAMIN D DEFICIENCY: ICD-10-CM

## 2024-04-16 DIAGNOSIS — E03.8 SECONDARY HYPOTHYROIDISM: ICD-10-CM

## 2024-04-16 DIAGNOSIS — E23.6 RATHKE'S CLEFT CYST (HCC): ICD-10-CM

## 2024-04-16 PROCEDURE — 3078F DIAST BP <80 MM HG: CPT | Performed by: INTERNAL MEDICINE

## 2024-04-16 PROCEDURE — 99214 OFFICE O/P EST MOD 30 MIN: CPT | Performed by: INTERNAL MEDICINE

## 2024-04-16 PROCEDURE — 99211 OFF/OP EST MAY X REQ PHY/QHP: CPT | Performed by: INTERNAL MEDICINE

## 2024-04-16 PROCEDURE — 3074F SYST BP LT 130 MM HG: CPT | Performed by: INTERNAL MEDICINE

## 2024-04-16 RX ORDER — LEVOTHYROXINE SODIUM 0.03 MG/1
25 TABLET ORAL
Qty: 90 TABLET | Refills: 3 | Status: SHIPPED | OUTPATIENT
Start: 2024-04-16

## 2024-04-16 ASSESSMENT — FIBROSIS 4 INDEX: FIB4 SCORE: 1.24

## 2024-04-16 NOTE — PROGRESS NOTES
Chief Complaint: Follow up for secondary hypothyroidism of unclear etiology    HPI:     LANG Harrell is a 78 y.o. female here for follow up of newly diagnosed secondary hypothyroidism.  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  Baseline measurement was 8 mm located in the posterior portion of the pituitary.  She has a history of chronic fatigue for the past 7 years    Her repeat MRI on Jan 2024 showed a stable Rathke cleft cyst.      Initial baseline workup for her thyroid showed normal TSH 1.7 with a slightly low free T4 of 0.61 with negative TPO antibodies on Jan 25 2022    Her TSH was normal at 3.2 but free T4 was low at 0.85 on 3/2024 ( at baseline again prior to thyroid hormone)      Initial baseline anterior pituitary hormone workup showed normal  Normal IGF-I of 122 prolactin of 6.91 ACTH of 18 cortisol 10.7 on October 2022 LH was elevated at 20 and FSH was elevated at 40 and estradiol was low at less than 5 compatible with her postmenopausal state.  Alpha subunit was normal at 0.61        She is now on Levothyroxine 25mcg daily.    She reports good compliance.    She still has fatigue  She denies constipation and cold intolerance  She reports that she was diagnosed with UC and she was treated with budesonide      Her thyroid labs are stable   Latest Reference Range & Units 03/04/24 10:12   TSH 0.380 - 5.330 uIU/mL 0.630   Free T-4 0.93 - 1.70 ng/dL 1.03                Patient's medications, allergies, and social histories were reviewed and updated as appropriate.      ROS:     CONS:     No fever, no chills   EYES:     No diplopia, no blurry vision   CV:           No chest pain, no palpitations   PULM:     No SOB, no cough, no hemoptysis.   GI:            No nausea, no vomiting, no diarrhea, no constipation   ENDO:     No polyuria, no polydipsia, no heat intolerance, no cold intolerance       Past Medical  "History:  Problem List:  2024: Malignant neoplasm of upper-outer quadrant of right breast   in female, estrogen receptor positive (HCC)  2022-10: Rathke's cleft cyst (HCC)  2015: History of colon polyps  2015: Osteopenia  2015: Hyperlipidemia  2012: Spinal stenosis of lumbar region  2008: Constipation  2008: Hemorrhoids      Past Surgical History:  Past Surgical History:   Procedure Laterality Date    PB MASTECTOMY, PARTIAL Right 2024    Procedure: RIGHT PARTIAL MASTECTOMY WITH RIGHT AXILLARY SENTINEL LYMPH NODE BIOPSY;  Surgeon: Corry Matthews M.D.;  Location: SURGERY SAME DAY [facility];  Service: General    [place] BIOPSY/EXCISION, LYMPH NODE(S) Right 2024    Procedure: BIOPSY, LYMPH NODE, SENTINEL;  Surgeon: Corry Matthews M.D.;  Location: SURGERY SAME DAY [facility];  Service: General    OTHER  2024    breast reduction three times    TUBAL LIGATION          Allergies:  Patient has no known allergies.     Social History:  Social History     Tobacco Use    Smoking status: Former     Current packs/day: 0.00     Types: Cigarettes     Quit date: 1960     Years since quittin.3     Passive exposure: Never    Smokeless tobacco: Never   Vaping Use    Vaping Use: Never used   Substance Use Topics    Alcohol use: Yes     Alcohol/week: 6.0 - 7.2 oz     Types: 10 - 12 Glasses of wine per week     Comment: Daily    Drug use: Yes     Types: Inhaled     Comment: CBD gummies and smokes marijuana rarely        Family History:   family history includes Cancer in her father.      PHYSICAL EXAM:   Vital signs: /78 (BP Location: Right arm, Patient Position: Sitting, BP Cuff Size: Adult)   Pulse 70   Ht 1.702 m (5' 7\")   Wt 73.7 kg (162 lb 8 oz)   SpO2 95%   BMI 25.45 kg/m²   GENERAL: Well-developed, well-nourished in no apparent distress.   EYE:  No ocular asymmetry, PERRLA  HENT: Pink, moist mucous membranes.    NECK: No thyromegaly.   CARDIOVASCULAR:  No murmurs  LUNGS: Clear breath " "sounds  ABDOMEN: Soft, nontender   EXTREMITIES: No clubbing, cyanosis, or edema.   NEUROLOGICAL: No gross focal motor abnormalities   LYMPH: No cervical adenopathy palpated.   SKIN: No rashes, lesions.       Labs:  Lab Results   Component Value Date/Time    SODIUM 137 03/04/2024 10:12 AM    POTASSIUM 4.7 03/04/2024 10:12 AM    CHLORIDE 101 03/04/2024 10:12 AM    CO2 26 03/04/2024 10:12 AM    ANION 10.0 03/04/2024 10:12 AM    GLUCOSE 78 03/04/2024 10:12 AM    BUN 9 03/04/2024 10:12 AM    CREATININE 0.73 03/04/2024 10:12 AM    CALCIUM 9.3 03/04/2024 10:12 AM    ASTSGOT 26 03/04/2024 10:12 AM    ALTSGPT 28 03/04/2024 10:12 AM    TBILIRUBIN 0.5 03/04/2024 10:12 AM    ALBUMIN 3.9 03/04/2024 10:12 AM    TOTPROTEIN 6.4 03/04/2024 10:12 AM    GLOBULIN 2.5 03/04/2024 10:12 AM    AGRATIO 1.6 03/04/2024 10:12 AM       Lab Results   Component Value Date/Time    SODIUM 139 01/11/2024 1424    POTASSIUM 4.3 01/11/2024 1424    CHLORIDE 100 01/11/2024 1424    CO2 27 01/11/2024 1424    GLUCOSE 89 01/11/2024 1424    BUN 11 01/11/2024 1424    CREATININE 0.68 01/11/2024 1424    CALCIUM 9.7 01/11/2024 1424    ANION 12.0 01/11/2024 1424       Lab Results   Component Value Date/Time    CHOLSTRLTOT 186 05/30/2023 0726    TRIGLYCERIDE 156 (H) 05/30/2023 0726    HDL 49 05/30/2023 0726     (H) 05/30/2023 0726       Lab Results   Component Value Date/Time    TSHULTRASEN 3.210 01/11/2024 1421     Lab Results   Component Value Date/Time    FREET4 0.85 (L) 01/11/2024 1421     No results found for: \"FREET3\"  No results found for: \"THYSTIMIG\"    No results found for: \"MICROSOMALA\"      Imaging:      ASSESSMENT/PLAN:     1. Secondary hypothyroidism  Controlled  Continue Levothyroxine 25mcg daily  We discussed how to properly take thyroid hormone.    Reviewed proper administration of thyroid hormone  Patient should take thyroid hormone 30-60 minutes before breakfast on an empty stomach plain water and not take it together with food, iron, " calcium, and antacids.  Iron, calcium, and antacids should be taken at least 4 hours apart from thyroid hormone.  I will see her again in 12 months with a repeat of her TSH and free T4.    2. Rathke's cleft cyst (HCC)  Stable her repeat pituitary MRI showed a stable Rathke cleft cyst    3. Vitamin D deficiency  Stable   Vitamin D labs were reviewed with patient  Continue current supplements  Continue monitoring levels         Return in about 1 year (around 4/16/2025).      Thank you kindly for allowing me to participate in the thyroid care plan for this patient.    Nba Jenkins MD, FACE, [facility]      CC:   Scarlet Kirby D.O.

## 2024-06-18 ENCOUNTER — HOSPITAL ENCOUNTER (OUTPATIENT)
Dept: LAB | Facility: MEDICAL CENTER | Age: 79
End: 2024-06-18
Attending: NURSE PRACTITIONER
Payer: MEDICARE

## 2024-06-18 DIAGNOSIS — R53.82 CHRONIC FATIGUE: ICD-10-CM

## 2024-06-18 DIAGNOSIS — M25.50 POLYARTHRALGIA: ICD-10-CM

## 2024-06-18 DIAGNOSIS — R20.0 NUMBNESS AROUND MOUTH: ICD-10-CM

## 2024-06-18 DIAGNOSIS — M35.9 UNDIFFERENTIATED CONNECTIVE TISSUE DISEASE (HCC): ICD-10-CM

## 2024-06-18 DIAGNOSIS — Z79.899 LONG-TERM USE OF PLAQUENIL: ICD-10-CM

## 2024-06-18 LAB
ALBUMIN SERPL BCP-MCNC: 3.9 G/DL (ref 3.2–4.9)
ALBUMIN/GLOB SERPL: 1.6 G/DL
ALP SERPL-CCNC: 101 U/L (ref 30–99)
ALT SERPL-CCNC: 38 U/L (ref 2–50)
ANION GAP SERPL CALC-SCNC: 10 MMOL/L (ref 7–16)
APPEARANCE UR: CLEAR
AST SERPL-CCNC: 28 U/L (ref 12–45)
BACTERIA #/AREA URNS HPF: NEGATIVE /HPF
BASOPHILS # BLD AUTO: 1.4 % (ref 0–1.8)
BASOPHILS # BLD: 0.09 K/UL (ref 0–0.12)
BILIRUB SERPL-MCNC: 0.3 MG/DL (ref 0.1–1.5)
BILIRUB UR QL STRIP.AUTO: NEGATIVE
BUN SERPL-MCNC: 12 MG/DL (ref 8–22)
C3 SERPL-MCNC: 111.9 MG/DL (ref 87–200)
C4 SERPL-MCNC: 25.9 MG/DL (ref 19–52)
CALCIUM ALBUM COR SERPL-MCNC: 9.3 MG/DL (ref 8.5–10.5)
CALCIUM SERPL-MCNC: 9.2 MG/DL (ref 8.5–10.5)
CHLORIDE SERPL-SCNC: 101 MMOL/L (ref 96–112)
CO2 SERPL-SCNC: 27 MMOL/L (ref 20–33)
COLOR UR: YELLOW
CREAT SERPL-MCNC: 0.72 MG/DL (ref 0.5–1.4)
CREAT UR-MCNC: 61.6 MG/DL
CRP SERPL HS-MCNC: 0.89 MG/DL (ref 0–0.75)
EOSINOPHIL # BLD AUTO: 0.24 K/UL (ref 0–0.51)
EOSINOPHIL NFR BLD: 3.8 % (ref 0–6.9)
EPI CELLS #/AREA URNS HPF: NEGATIVE /HPF
ERYTHROCYTE [DISTWIDTH] IN BLOOD BY AUTOMATED COUNT: 46.1 FL (ref 35.9–50)
ERYTHROCYTE [SEDIMENTATION RATE] IN BLOOD BY WESTERGREN METHOD: 18 MM/HOUR (ref 0–25)
FASTING STATUS PATIENT QL REPORTED: NORMAL
GFR SERPLBLD CREATININE-BSD FMLA CKD-EPI: 86 ML/MIN/1.73 M 2
GLOBULIN SER CALC-MCNC: 2.5 G/DL (ref 1.9–3.5)
GLUCOSE SERPL-MCNC: 86 MG/DL (ref 65–99)
GLUCOSE UR STRIP.AUTO-MCNC: NEGATIVE MG/DL
HCT VFR BLD AUTO: 42.3 % (ref 37–47)
HGB BLD-MCNC: 14 G/DL (ref 12–16)
HYALINE CASTS #/AREA URNS LPF: NORMAL /LPF
IMM GRANULOCYTES # BLD AUTO: 0.09 K/UL (ref 0–0.11)
IMM GRANULOCYTES NFR BLD AUTO: 1.4 % (ref 0–0.9)
KETONES UR STRIP.AUTO-MCNC: NEGATIVE MG/DL
LEUKOCYTE ESTERASE UR QL STRIP.AUTO: ABNORMAL
LYMPHOCYTES # BLD AUTO: 2.27 K/UL (ref 1–4.8)
LYMPHOCYTES NFR BLD: 36.1 % (ref 22–41)
MCH RBC QN AUTO: 32.1 PG (ref 27–33)
MCHC RBC AUTO-ENTMCNC: 33.1 G/DL (ref 32.2–35.5)
MCV RBC AUTO: 97 FL (ref 81.4–97.8)
MICRO URNS: ABNORMAL
MONOCYTES # BLD AUTO: 0.72 K/UL (ref 0–0.85)
MONOCYTES NFR BLD AUTO: 11.5 % (ref 0–13.4)
NEUTROPHILS # BLD AUTO: 2.87 K/UL (ref 1.82–7.42)
NEUTROPHILS NFR BLD: 45.8 % (ref 44–72)
NITRITE UR QL STRIP.AUTO: NEGATIVE
NRBC # BLD AUTO: 0 K/UL
NRBC BLD-RTO: 0 /100 WBC (ref 0–0.2)
PH UR STRIP.AUTO: 7 [PH] (ref 5–8)
PLATELET # BLD AUTO: 325 K/UL (ref 164–446)
PMV BLD AUTO: 9.8 FL (ref 9–12.9)
POTASSIUM SERPL-SCNC: 4.5 MMOL/L (ref 3.6–5.5)
PROT SERPL-MCNC: 6.4 G/DL (ref 6–8.2)
PROT UR QL STRIP: NEGATIVE MG/DL
PROT UR-MCNC: 7 MG/DL (ref 0–15)
PROT/CREAT UR: 114 MG/G (ref 10–107)
RBC # BLD AUTO: 4.36 M/UL (ref 4.2–5.4)
RBC # URNS HPF: NORMAL /HPF
RBC UR QL AUTO: NEGATIVE
SODIUM SERPL-SCNC: 138 MMOL/L (ref 135–145)
SP GR UR STRIP.AUTO: 1.01
UROBILINOGEN UR STRIP.AUTO-MCNC: 0.2 MG/DL
WBC # BLD AUTO: 6.3 K/UL (ref 4.8–10.8)
WBC #/AREA URNS HPF: NORMAL /HPF

## 2024-06-18 PROCEDURE — 84156 ASSAY OF PROTEIN URINE: CPT

## 2024-06-18 PROCEDURE — 85652 RBC SED RATE AUTOMATED: CPT

## 2024-06-18 PROCEDURE — 85025 COMPLETE CBC W/AUTO DIFF WBC: CPT

## 2024-06-18 PROCEDURE — 80053 COMPREHEN METABOLIC PANEL: CPT

## 2024-06-18 PROCEDURE — 36415 COLL VENOUS BLD VENIPUNCTURE: CPT

## 2024-06-18 PROCEDURE — 86140 C-REACTIVE PROTEIN: CPT

## 2024-06-18 PROCEDURE — 86160 COMPLEMENT ANTIGEN: CPT | Mod: 91

## 2024-06-18 PROCEDURE — 82570 ASSAY OF URINE CREATININE: CPT

## 2024-06-18 PROCEDURE — 81001 URINALYSIS AUTO W/SCOPE: CPT

## 2025-04-11 ENCOUNTER — HOSPITAL ENCOUNTER (OUTPATIENT)
Dept: LAB | Facility: MEDICAL CENTER | Age: 80
End: 2025-04-11
Attending: INTERNAL MEDICINE
Payer: MEDICARE

## 2025-04-11 DIAGNOSIS — E03.8 SECONDARY HYPOTHYROIDISM: ICD-10-CM

## 2025-04-11 DIAGNOSIS — E23.6 RATHKE'S CLEFT CYST (HCC): ICD-10-CM

## 2025-04-11 DIAGNOSIS — E55.9 VITAMIN D DEFICIENCY: ICD-10-CM

## 2025-04-11 LAB
25(OH)D3 SERPL-MCNC: 50 NG/ML (ref 30–100)
ALBUMIN SERPL BCP-MCNC: 4.4 G/DL (ref 3.2–4.9)
ALBUMIN/GLOB SERPL: 1.5 G/DL
ALP SERPL-CCNC: 88 U/L (ref 30–99)
ALT SERPL-CCNC: 34 U/L (ref 2–50)
ANION GAP SERPL CALC-SCNC: 16 MMOL/L (ref 7–16)
AST SERPL-CCNC: 24 U/L (ref 12–45)
BILIRUB SERPL-MCNC: 0.6 MG/DL (ref 0.1–1.5)
BUN SERPL-MCNC: 9 MG/DL (ref 8–22)
CALCIUM ALBUM COR SERPL-MCNC: 9.7 MG/DL (ref 8.5–10.5)
CALCIUM SERPL-MCNC: 10 MG/DL (ref 8.5–10.5)
CHLORIDE SERPL-SCNC: 95 MMOL/L (ref 96–112)
CO2 SERPL-SCNC: 28 MMOL/L (ref 20–33)
CREAT SERPL-MCNC: 0.69 MG/DL (ref 0.5–1.4)
GFR SERPLBLD CREATININE-BSD FMLA CKD-EPI: 89 ML/MIN/1.73 M 2
GLOBULIN SER CALC-MCNC: 2.9 G/DL (ref 1.9–3.5)
GLUCOSE SERPL-MCNC: 79 MG/DL (ref 65–99)
POTASSIUM SERPL-SCNC: 3.1 MMOL/L (ref 3.6–5.5)
PROT SERPL-MCNC: 7.3 G/DL (ref 6–8.2)
SODIUM SERPL-SCNC: 139 MMOL/L (ref 135–145)
T4 FREE SERPL-MCNC: 1.96 NG/DL (ref 0.93–1.7)
TSH SERPL-ACNC: 0.02 UIU/ML (ref 0.38–5.33)

## 2025-04-11 PROCEDURE — 80053 COMPREHEN METABOLIC PANEL: CPT

## 2025-04-11 PROCEDURE — 84443 ASSAY THYROID STIM HORMONE: CPT

## 2025-04-11 PROCEDURE — 84439 ASSAY OF FREE THYROXINE: CPT

## 2025-04-11 PROCEDURE — 82306 VITAMIN D 25 HYDROXY: CPT

## 2025-04-11 PROCEDURE — 36415 COLL VENOUS BLD VENIPUNCTURE: CPT

## 2025-04-25 ENCOUNTER — TELEMEDICINE (OUTPATIENT)
Dept: ENDOCRINOLOGY | Facility: MEDICAL CENTER | Age: 80
End: 2025-04-25
Attending: INTERNAL MEDICINE
Payer: MEDICARE

## 2025-04-25 VITALS — HEIGHT: 67 IN | WEIGHT: 159 LBS | BODY MASS INDEX: 24.96 KG/M2

## 2025-04-25 DIAGNOSIS — E55.9 VITAMIN D DEFICIENCY: ICD-10-CM

## 2025-04-25 DIAGNOSIS — E23.6 RATHKE'S CLEFT CYST (HCC): ICD-10-CM

## 2025-04-25 DIAGNOSIS — E03.8 SECONDARY HYPOTHYROIDISM: ICD-10-CM

## 2025-04-25 ASSESSMENT — FIBROSIS 4 INDEX: FIB4 SCORE: 0.98

## 2025-04-25 NOTE — PROGRESS NOTES
Chief Complaint: Follow up for secondary hypothyroidism of unclear etiology    HPI:     LANG Harrell is a 78 y.o. female here for follow up of newly diagnosed secondary hypothyroidism.  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  Baseline measurement was 8 mm located in the posterior portion of the pituitary.  She has a history of chronic fatigue for the past 7 years    Her repeat MRI on Jan 2024 showed a stable Rathke cleft cyst.      Initial baseline workup for her thyroid showed normal TSH 1.7 with a slightly low free T4 of 0.61 with negative TPO antibodies on Jan 25 2022    Her TSH was normal at 3.2 but free T4 was low at 0.85 on 3/2024 ( at baseline again prior to thyroid hormone)      Initial baseline anterior pituitary hormone workup showed normal  Normal IGF-I of 122 prolactin of 6.91 ACTH of 18 cortisol 10.7 on October 2022 LH was elevated at 20 and FSH was elevated at 40 and estradiol was low at less than 5 compatible with her postmenopausal state.  Alpha subunit was normal at 0.61        She is now on Levothyroxine 25mcg daily.    She reports good compliance.    She still has fatigue  She reports wt loss   She reports her skin is better   She denies constipation and cold intolerance        Her thyroid labs are abnormal   She denies taking biotin   She is taking collagen   Latest Reference Range & Units 04/11/25 12:01   TSH 0.380 - 5.330 uIU/mL 0.016 (L)   Free T-4 0.93 - 1.70 ng/dL 1.96 (H)         Latest Reference Range & Units 04/11/25 12:01   25-Hydroxy   Vitamin D 25 30 - 100 ng/mL 50       Patient's medications, allergies, and social histories were reviewed and updated as appropriate.      ROS:     CONS:     No fever, no chills   EYES:     No diplopia, no blurry vision   CV:           No chest pain, no palpitations   PULM:     No SOB, no cough, no hemoptysis.   GI:            No nausea, no vomiting, no  "diarrhea, no constipation   ENDO:     No polyuria, no polydipsia, no heat intolerance, no cold intolerance       Past Medical History:  Problem List:  2024: Secondary hypothyroidism  2024: Malignant neoplasm of upper-outer quadrant of right breast   in female, estrogen receptor positive (HCC)  2022-10: Rathke's cleft cyst (HCC)  2015: History of colon polyps  2015: Osteopenia  2015: Hyperlipidemia  2012: Spinal stenosis of lumbar region  2008: Constipation  2008: Hemorrhoids      Past Surgical History:  Past Surgical History:   Procedure Laterality Date    PB MASTECTOMY, PARTIAL Right 2024    Procedure: RIGHT PARTIAL MASTECTOMY WITH RIGHT AXILLARY SENTINEL LYMPH NODE BIOPSY;  Surgeon: Corry Matthews M.D.;  Location: SURGERY SAME DAY [facility];  Service: General    [place] BIOPSY/EXCISION, LYMPH NODE(S) Right 2024    Procedure: BIOPSY, LYMPH NODE, SENTINEL;  Surgeon: Corry Matthews M.D.;  Location: SURGERY SAME DAY [facility];  Service: General    OTHER  2024    breast reduction three times    TUBAL LIGATION          Allergies:  Patient has no known allergies.     Social History:  Social History     Tobacco Use    Smoking status: Former     Current packs/day: 0.00     Types: Cigarettes     Quit date: 1960     Years since quittin.3     Passive exposure: Never    Smokeless tobacco: Never   Vaping Use    Vaping status: Never Used   Substance Use Topics    Alcohol use: Yes     Alcohol/week: 6.0 - 7.2 oz     Types: 10 - 12 Glasses of wine per week     Comment: Daily    Drug use: Yes     Types: Inhaled     Comment: CBD gummies and smokes marijuana rarely        Family History:   family history includes Cancer in her father.      PHYSICAL EXAM:   Vital signs: Ht 1.702 m (5' 7\")   Wt 72.1 kg (159 lb)   BMI 24.90 kg/m²   GENERAL: Well-developed, well-nourished in no apparent distress.   EYE:  No ocular asymmetry, PERRLA  HENT: Pink, moist mucous membranes.    NECK: No thyromegaly. " "  CARDIOVASCULAR:  No murmurs  LUNGS: Clear breath sounds  ABDOMEN: Soft, nontender   EXTREMITIES: No clubbing, cyanosis, or edema.   NEUROLOGICAL: No gross focal motor abnormalities   LYMPH: No cervical adenopathy palpated.   SKIN: No rashes, lesions.       Labs:  Lab Results   Component Value Date/Time    SODIUM 139 04/11/2025 12:01 PM    POTASSIUM 3.1 (L) 04/11/2025 12:01 PM    CHLORIDE 95 (L) 04/11/2025 12:01 PM    CO2 28 04/11/2025 12:01 PM    ANION 16.0 04/11/2025 12:01 PM    GLUCOSE 79 04/11/2025 12:01 PM    BUN 9 04/11/2025 12:01 PM    CREATININE 0.69 04/11/2025 12:01 PM    CALCIUM 10.0 04/11/2025 12:01 PM    ASTSGOT 24 04/11/2025 12:01 PM    ALTSGPT 34 04/11/2025 12:01 PM    TBILIRUBIN 0.6 04/11/2025 12:01 PM    ALBUMIN 4.4 04/11/2025 12:01 PM    TOTPROTEIN 7.3 04/11/2025 12:01 PM    GLOBULIN 2.9 04/11/2025 12:01 PM    AGRATIO 1.5 04/11/2025 12:01 PM       Lab Results   Component Value Date/Time    SODIUM 139 01/11/2024 1424    POTASSIUM 4.3 01/11/2024 1424    CHLORIDE 100 01/11/2024 1424    CO2 27 01/11/2024 1424    GLUCOSE 89 01/11/2024 1424    BUN 11 01/11/2024 1424    CREATININE 0.68 01/11/2024 1424    CALCIUM 9.7 01/11/2024 1424    ANION 12.0 01/11/2024 1424       Lab Results   Component Value Date/Time    CHOLSTRLTOT 186 05/30/2023 0726    TRIGLYCERIDE 156 (H) 05/30/2023 0726    HDL 49 05/30/2023 0726     (H) 05/30/2023 0726       Lab Results   Component Value Date/Time    TSHULTRASEN 3.210 01/11/2024 1421     Lab Results   Component Value Date/Time    FREET4 0.85 (L) 01/11/2024 1421     No results found for: \"FREET3\"  No results found for: \"THYSTIMIG\"    No results found for: \"MICROSOMALA\"      Imaging:      ASSESSMENT/PLAN:     1. Secondary hypothyroidism  Uncontrolled  Thyroid lab are abnormal   She denies palpitations but reports unintentional weight loss (5-6 lbs)  Stop Levothyroxine  Repeat labs in 1 month  Will make appt depending on results     2. Rathke's cleft cyst (HCC)  Stable her " repeat pituitary MRI showed a stable Rathke cleft cyst    3. Vitamin D deficiency  Stable   Vitamin D labs were reviewed with patient  Continue current supplements  Continue monitoring levels         No follow-ups on file.      Thank you kindly for allowing me to participate in the thyroid care plan for this patient.    Nba Jenkins MD, FACE, [facility]      CC:   Scarlet Kirby D.O.

## 2025-05-22 ENCOUNTER — HOSPITAL ENCOUNTER (OUTPATIENT)
Dept: LAB | Facility: MEDICAL CENTER | Age: 80
End: 2025-05-22
Attending: INTERNAL MEDICINE
Payer: MEDICARE

## 2025-05-22 DIAGNOSIS — E03.8 SECONDARY HYPOTHYROIDISM: ICD-10-CM

## 2025-05-22 LAB
T3FREE SERPL-MCNC: 2.73 PG/ML (ref 2–4.4)
T4 FREE SERPL-MCNC: 0.94 NG/DL (ref 0.93–1.7)
TSH SERPL-ACNC: 3.36 UIU/ML (ref 0.38–5.33)

## 2025-05-22 PROCEDURE — 84445 ASSAY OF TSI GLOBULIN: CPT

## 2025-05-22 PROCEDURE — 84439 ASSAY OF FREE THYROXINE: CPT

## 2025-05-22 PROCEDURE — 36415 COLL VENOUS BLD VENIPUNCTURE: CPT

## 2025-05-22 PROCEDURE — 84481 FREE ASSAY (FT-3): CPT

## 2025-05-22 PROCEDURE — 83520 IMMUNOASSAY QUANT NOS NONAB: CPT

## 2025-05-22 PROCEDURE — 84443 ASSAY THYROID STIM HORMONE: CPT

## 2025-05-23 ENCOUNTER — RESULTS FOLLOW-UP (OUTPATIENT)
Dept: ENDOCRINOLOGY | Facility: MEDICAL CENTER | Age: 80
End: 2025-05-23

## 2025-05-24 LAB
TSH RECEP AB SER-ACNC: <1.1 IU/L
TSI SER-ACNC: <0.1 IU/L

## 2025-06-23 ENCOUNTER — HOSPITAL ENCOUNTER (OUTPATIENT)
Dept: LAB | Facility: MEDICAL CENTER | Age: 80
End: 2025-06-23
Attending: INTERNAL MEDICINE
Payer: MEDICARE

## 2025-06-23 DIAGNOSIS — R76.8 POSITIVE ANA (ANTINUCLEAR ANTIBODY): ICD-10-CM

## 2025-06-23 LAB
ALBUMIN SERPL BCP-MCNC: 4 G/DL (ref 3.2–4.9)
ALBUMIN/GLOB SERPL: 1.4 G/DL
ALP SERPL-CCNC: 88 U/L (ref 30–99)
ALT SERPL-CCNC: 31 U/L (ref 2–50)
ANION GAP SERPL CALC-SCNC: 12 MMOL/L (ref 7–16)
AST SERPL-CCNC: 25 U/L (ref 12–45)
BASOPHILS # BLD AUTO: 1.2 % (ref 0–1.8)
BASOPHILS # BLD: 0.09 K/UL (ref 0–0.12)
BILIRUB SERPL-MCNC: 0.6 MG/DL (ref 0.1–1.5)
BUN SERPL-MCNC: 10 MG/DL (ref 8–22)
CALCIUM ALBUM COR SERPL-MCNC: 9.4 MG/DL (ref 8.5–10.5)
CALCIUM SERPL-MCNC: 9.4 MG/DL (ref 8.5–10.5)
CHLORIDE SERPL-SCNC: 100 MMOL/L (ref 96–112)
CO2 SERPL-SCNC: 25 MMOL/L (ref 20–33)
CREAT SERPL-MCNC: 0.86 MG/DL (ref 0.5–1.4)
CREAT UR-MCNC: 92.6 MG/DL
CRP SERPL HS-MCNC: 1.34 MG/DL (ref 0–0.75)
EOSINOPHIL # BLD AUTO: 0.12 K/UL (ref 0–0.51)
EOSINOPHIL NFR BLD: 1.7 % (ref 0–6.9)
ERYTHROCYTE [DISTWIDTH] IN BLOOD BY AUTOMATED COUNT: 50.7 FL (ref 35.9–50)
ERYTHROCYTE [SEDIMENTATION RATE] IN BLOOD BY WESTERGREN METHOD: 6 MM/HOUR (ref 0–25)
GFR SERPLBLD CREATININE-BSD FMLA CKD-EPI: 69 ML/MIN/1.73 M 2
GLOBULIN SER CALC-MCNC: 2.9 G/DL (ref 1.9–3.5)
GLUCOSE SERPL-MCNC: 85 MG/DL (ref 65–99)
HCT VFR BLD AUTO: 45.9 % (ref 37–47)
HGB BLD-MCNC: 15.3 G/DL (ref 12–16)
IMM GRANULOCYTES # BLD AUTO: 0.01 K/UL (ref 0–0.11)
IMM GRANULOCYTES NFR BLD AUTO: 0.1 % (ref 0–0.9)
LYMPHOCYTES # BLD AUTO: 2.3 K/UL (ref 1–4.8)
LYMPHOCYTES NFR BLD: 31.9 % (ref 22–41)
MCH RBC QN AUTO: 32.2 PG (ref 27–33)
MCHC RBC AUTO-ENTMCNC: 33.3 G/DL (ref 32.2–35.5)
MCV RBC AUTO: 96.6 FL (ref 81.4–97.8)
MONOCYTES # BLD AUTO: 0.76 K/UL (ref 0–0.85)
MONOCYTES NFR BLD AUTO: 10.5 % (ref 0–13.4)
NEUTROPHILS # BLD AUTO: 3.93 K/UL (ref 1.82–7.42)
NEUTROPHILS NFR BLD: 54.6 % (ref 44–72)
NRBC # BLD AUTO: 0 K/UL
NRBC BLD-RTO: 0 /100 WBC (ref 0–0.2)
PLATELET # BLD AUTO: 242 K/UL (ref 164–446)
PMV BLD AUTO: 10 FL (ref 9–12.9)
POTASSIUM SERPL-SCNC: 3.6 MMOL/L (ref 3.6–5.5)
PROT SERPL-MCNC: 6.9 G/DL (ref 6–8.2)
PROT UR-MCNC: 12.6 MG/DL (ref 0–15)
PROT/CREAT UR: 136 MG/G (ref 10–107)
RBC # BLD AUTO: 4.75 M/UL (ref 4.2–5.4)
SODIUM SERPL-SCNC: 137 MMOL/L (ref 135–145)
WBC # BLD AUTO: 7.2 K/UL (ref 4.8–10.8)

## 2025-06-23 PROCEDURE — 84156 ASSAY OF PROTEIN URINE: CPT

## 2025-06-23 PROCEDURE — 85025 COMPLETE CBC W/AUTO DIFF WBC: CPT

## 2025-06-23 PROCEDURE — 82570 ASSAY OF URINE CREATININE: CPT

## 2025-06-23 PROCEDURE — 85652 RBC SED RATE AUTOMATED: CPT

## 2025-06-23 PROCEDURE — 86140 C-REACTIVE PROTEIN: CPT

## 2025-06-23 PROCEDURE — 36415 COLL VENOUS BLD VENIPUNCTURE: CPT

## 2025-06-23 PROCEDURE — 80053 COMPREHEN METABOLIC PANEL: CPT

## (undated) DEVICE — SLEEVE VASO CALF MED - (10PR/CA)

## (undated) DEVICE — KIT  I.V. START (100EA/CA)

## (undated) DEVICE — SUTURE 3-0 VICRYL PLUS SH - 8X 18 INCH (12/BX)

## (undated) DEVICE — COVER CIV-FLEX TRANSDUCER - (24/BX)

## (undated) DEVICE — SODIUM CHL IRRIGATION 0.9% 1000ML (12EA/CA)

## (undated) DEVICE — BOVIE BLADE COATED &INSULATED - 25/PK

## (undated) DEVICE — SENSOR OXIMETER ADULT SPO2 RD SET (20EA/BX)

## (undated) DEVICE — CANISTER SUCTION 3000ML MECHANICAL FILTER AUTO SHUTOFF MEDI-VAC NONSTERILE LF DISP  (40EA/CA)

## (undated) DEVICE — SUCTION INSTRUMENT YANKAUER BULBOUS TIP W/O VENT (50EA/CA)

## (undated) DEVICE — GLOVE SZ 6 BIOGEL PI MICRO - PF LF (50PR/BX 4BX/CA)

## (undated) DEVICE — Device

## (undated) DEVICE — TUBE CONNECTING SUCTION - CLEAR PLASTIC STERILE 72 IN (50EA/CA)

## (undated) DEVICE — TUBING CLEARLINK DUO-VENT - C-FLO (48EA/CA)

## (undated) DEVICE — SUTURE GENERAL

## (undated) DEVICE — MASK AIRWAY FLEXIBLE SINGLE-USE SIZE 4 ADULTS (10EA/BX)

## (undated) DEVICE — SUTURE 4-0 MONOCRYL PLUS PS-1 - 27 INCH (36/BX)

## (undated) DEVICE — MASK OXYGEN VNYL ADLT MED CONC WITH 7 FOOT TUBING  - (50EA/CA)

## (undated) DEVICE — COVER SHEATH PROBE FOR SAVI SCOUT (20EA/BX)

## (undated) DEVICE — SET LEADWIRE 5 LEAD BEDSIDE DISPOSABLE ECG (1SET OF 5/EA)

## (undated) DEVICE — SHEET TRANSVERSE LAP - (12EA/CA)

## (undated) DEVICE — GOWN SURGEONS LARGE - (32/CA)

## (undated) DEVICE — TOWEL STOP TIMEOUT SAFETY FLAG (40EA/CA)

## (undated) DEVICE — LACTATED RINGERS INJ 1000 ML - (14EA/CA 60CA/PF)

## (undated) DEVICE — DRAPE LAPAROTOMY T SHEET - (12EA/CA)

## (undated) DEVICE — CANISTER SUCTION RIGID RED 1500CC (40EA/CA)

## (undated) DEVICE — CANNULA O2 COMFORT SOFT EAR ADULT 7 FT TUBING (50/CA)

## (undated) DEVICE — DERMABOND ADVANCED - (12EA/BX)

## (undated) DEVICE — SYRINGE 30 ML LL (56/BX)

## (undated) DEVICE — GLOVE BIOGEL INDICATOR SZ 7SURGICAL PF LTX - (50/BX 4BX/CA)

## (undated) DEVICE — GOWN WARMING STANDARD FLEX - (30/CA)